# Patient Record
Sex: MALE | Race: WHITE | NOT HISPANIC OR LATINO | Employment: FULL TIME | URBAN - NONMETROPOLITAN AREA
[De-identification: names, ages, dates, MRNs, and addresses within clinical notes are randomized per-mention and may not be internally consistent; named-entity substitution may affect disease eponyms.]

---

## 2018-06-23 ENCOUNTER — HOSPITAL ENCOUNTER (EMERGENCY)
Facility: HOSPITAL | Age: 23
Discharge: HOME | End: 2018-06-23
Attending: EMERGENCY MEDICINE | Admitting: EMERGENCY MEDICINE

## 2018-06-23 VITALS
HEART RATE: 87 BPM | DIASTOLIC BLOOD PRESSURE: 80 MMHG | OXYGEN SATURATION: 100 % | BODY MASS INDEX: 45.1 KG/M2 | SYSTOLIC BLOOD PRESSURE: 148 MMHG | WEIGHT: 315 LBS | HEIGHT: 70 IN | RESPIRATION RATE: 18 BRPM | TEMPERATURE: 97.9 F

## 2018-06-23 DIAGNOSIS — L98.9 SKIN LESION OF NECK: ICD-10-CM

## 2018-06-23 DIAGNOSIS — J06.9 UPPER RESPIRATORY TRACT INFECTION, UNSPECIFIED TYPE: Primary | ICD-10-CM

## 2018-06-23 PROCEDURE — 99282 EMERGENCY DEPT VISIT SF MDM: CPT

## 2018-06-23 RX ORDER — CEPHALEXIN 500 MG/1
500 CAPSULE ORAL 4 TIMES DAILY
Qty: 40 CAPSULE | Refills: 0 | Status: SHIPPED | OUTPATIENT
Start: 2018-06-23 | End: 2018-11-06

## 2018-06-23 RX ORDER — ASPIRIN 81 MG/1
81 TABLET, CHEWABLE ORAL DAILY
COMMUNITY
End: 2018-11-06 | Stop reason: ALTCHOICE

## 2018-06-23 RX ORDER — CETIRIZINE HYDROCHLORIDE 10 MG/1
10 TABLET ORAL DAILY
Qty: 14 TABLET | Refills: 0 | Status: SHIPPED | OUTPATIENT
Start: 2018-06-23 | End: 2018-11-06

## 2018-06-23 RX ORDER — LISINOPRIL AND HYDROCHLOROTHIAZIDE 20; 12.5 MG/1; MG/1
1 TABLET ORAL DAILY
COMMUNITY
End: 2018-11-06 | Stop reason: ALTCHOICE

## 2018-06-23 RX ORDER — AMLODIPINE BESYLATE 5 MG/1
5 TABLET ORAL DAILY
COMMUNITY
End: 2018-11-06 | Stop reason: ALTCHOICE

## 2018-06-23 RX ORDER — PAROXETINE 10 MG/1
10 TABLET, FILM COATED ORAL EVERY MORNING
COMMUNITY
End: 2018-11-06 | Stop reason: ALTCHOICE

## 2018-10-26 ENCOUNTER — TRANSCRIBE ORDERS (OUTPATIENT)
Dept: ADMINISTRATIVE | Facility: HOSPITAL | Age: 23
End: 2018-10-26

## 2018-10-26 DIAGNOSIS — Q28.2 CEREBRAL-RETINAL ARTERIOVENOUS ANEURYSM: Primary | ICD-10-CM

## 2018-11-06 ENCOUNTER — CONSULT (OUTPATIENT)
Dept: CARDIOLOGY | Facility: CLINIC | Age: 23
End: 2018-11-06

## 2018-11-06 VITALS
WEIGHT: 315 LBS | HEIGHT: 70 IN | SYSTOLIC BLOOD PRESSURE: 130 MMHG | HEART RATE: 96 BPM | RESPIRATION RATE: 18 BRPM | BODY MASS INDEX: 45.1 KG/M2 | OXYGEN SATURATION: 99 % | DIASTOLIC BLOOD PRESSURE: 80 MMHG

## 2018-11-06 DIAGNOSIS — E66.01 MORBIDLY OBESE (HCC): ICD-10-CM

## 2018-11-06 DIAGNOSIS — Q28.2 AVM (ARTERIOVENOUS MALFORMATION) BRAIN: ICD-10-CM

## 2018-11-06 DIAGNOSIS — I10 ESSENTIAL HYPERTENSION: Primary | ICD-10-CM

## 2018-11-06 PROCEDURE — 93000 ELECTROCARDIOGRAM COMPLETE: CPT | Performed by: INTERNAL MEDICINE

## 2018-11-06 PROCEDURE — 99243 OFF/OP CNSLTJ NEW/EST LOW 30: CPT | Performed by: INTERNAL MEDICINE

## 2018-11-06 RX ORDER — CLONIDINE HYDROCHLORIDE 0.1 MG/1
0.1 TABLET ORAL EVERY 6 HOURS SCHEDULED
Qty: 30 TABLET | Refills: 11 | Status: SHIPPED | OUTPATIENT
Start: 2018-11-06 | End: 2018-11-06 | Stop reason: SDUPTHER

## 2018-11-06 RX ORDER — CLONIDINE HYDROCHLORIDE 0.1 MG/1
0.1 TABLET ORAL EVERY 6 HOURS SCHEDULED
Qty: 30 TABLET | Refills: 11 | Status: SHIPPED | OUTPATIENT
Start: 2018-11-06

## 2018-11-06 RX ORDER — GLIPIZIDE 5 MG/1
5 TABLET, FILM COATED, EXTENDED RELEASE ORAL DAILY
COMMUNITY
Start: 2018-10-18

## 2018-11-06 RX ORDER — AMLODIPINE BESYLATE 10 MG/1
10 TABLET ORAL DAILY
COMMUNITY
Start: 2018-10-18

## 2018-11-06 RX ORDER — PRAVASTATIN SODIUM 40 MG
40 TABLET ORAL DAILY
COMMUNITY
Start: 2018-10-18

## 2018-11-06 RX ORDER — FLUOXETINE HYDROCHLORIDE 20 MG/1
60 CAPSULE ORAL DAILY
COMMUNITY
Start: 2018-10-18

## 2018-11-06 RX ORDER — LISINOPRIL AND HYDROCHLOROTHIAZIDE 25; 20 MG/1; MG/1
2 TABLET ORAL DAILY
COMMUNITY
Start: 2018-10-18 | End: 2022-08-18

## 2018-11-06 NOTE — PROGRESS NOTES
Subjective:     Encounter Date:11/06/2018      Patient ID: Ken Cheung is a 23 y.o. male.    Chief Complaint: Hypertension  HPI  This is a 23-year-old male patient with a history of at least 5 years of hypertension that has been reasonably difficult to control.  The patient is currently on 3 high-dose antihypertensive medications and reports compliance.  His recent blood pressures have been significantly improved.  The patient is currently trying to lose weight and has lost from 348 pounds down to 331 pounds.  The patient also has a history of a documented cerebral arteriovenous malformation which has leaked on at least one occasion with small intracranial hemorrhage.  The patient has no chest discomfort at rest or with activity.  There is no shortness of breath.  There is no orthopnea PND or lower extremity edema.  He reports some dizziness if he changes posture rapidly.  This is particularly worse if he is bending over or squatting down and raises up suddenly.  He has no palpitations or syncope.  He is a nonsmoker.  The following portions of the patient's history were reviewed and updated as appropriate: allergies, current medications, past family history, past medical history, past social history, past surgical history and problem  Review of Systems   Constitution: Positive for weakness and malaise/fatigue. Negative for chills, diaphoresis, fever, weight gain and weight loss.   HENT: Negative for ear discharge, hearing loss, hoarse voice and nosebleeds.    Eyes: Negative for discharge, double vision, pain and photophobia.   Cardiovascular: Negative for chest pain, claudication, cyanosis, dyspnea on exertion, irregular heartbeat, leg swelling, near-syncope, orthopnea, palpitations, paroxysmal nocturnal dyspnea and syncope.   Respiratory: Negative for cough, hemoptysis, sputum production and wheezing.    Endocrine: Negative for cold intolerance, heat intolerance, polydipsia, polyphagia and polyuria.    Hematologic/Lymphatic: Negative for adenopathy and bleeding problem. Does not bruise/bleed easily.   Skin: Negative for color change, flushing, itching and rash.   Musculoskeletal: Negative for muscle cramps, muscle weakness, myalgias and stiffness.   Gastrointestinal: Negative for abdominal pain, diarrhea, hematemesis, hematochezia, nausea and vomiting.   Genitourinary: Negative for dysuria, frequency and nocturia.   Neurological: Positive for dizziness. Negative for focal weakness, loss of balance, numbness, paresthesias and seizures.   Psychiatric/Behavioral: Negative for altered mental status, hallucinations and suicidal ideas.   Allergic/Immunologic: Negative for HIV exposure, hives and persistent infections.           Current Outpatient Prescriptions:   •  amLODIPine (NORVASC) 10 MG tablet, Daily., Disp: , Rfl:   •  CloNIDine (CATAPRES) 0.1 MG tablet, Take 1 tablet by mouth Every 6 (Six) Hours. PRN for SBP>150 mm Hg and/or DBP> 100 mm Hg Take 2 tablets PO as needed for SBP> 200 mm Hg, Disp: 30 tablet, Rfl: 11  •  FLUoxetine (PROzac) 20 MG capsule, Daily., Disp: , Rfl:   •  glipiZIDE (GLUCOTROL XL) 5 MG ER tablet, Daily., Disp: , Rfl:   •  lisinopril-hydrochlorothiazide (PRINZIDE,ZESTORETIC) 20-25 MG per tablet, 2 tablets Daily., Disp: , Rfl:   •  pravastatin (PRAVACHOL) 40 MG tablet, , Disp: , Rfl:      Objective:     Physical Exam   Constitutional: He is oriented to person, place, and time. He appears well-developed and well-nourished. No distress.   HENT:   Head: Normocephalic and atraumatic.   Mouth/Throat: Oropharynx is clear and moist.   Eyes: Pupils are equal, round, and reactive to light. Conjunctivae and EOM are normal. No scleral icterus.   Neck: Normal range of motion. Neck supple. No JVD present. No tracheal deviation present. No thyromegaly present.   Cardiovascular: Normal rate, regular rhythm, S1 normal, S2 normal, normal heart sounds, intact distal pulses and normal pulses.  PMI is not  "displaced.  Exam reveals no gallop and no friction rub.    No murmur heard.  Pulmonary/Chest: Effort normal and breath sounds normal. No respiratory distress. He has no wheezes. He has no rales.   Abdominal: Soft. Bowel sounds are normal. He exhibits no distension and no mass. There is no tenderness. There is no rebound and no guarding.   Musculoskeletal: Normal range of motion. He exhibits no edema or deformity.   Neurological: He is alert and oriented to person, place, and time. He displays normal reflexes. No cranial nerve deficit. Coordination normal.   Skin: Skin is warm and dry. No rash noted. He is not diaphoretic. No erythema.   Psychiatric: He has a normal mood and affect. His behavior is normal. Thought content normal.     Blood pressure 130/80, pulse 96, resp. rate 18, height 177.8 cm (70\"), weight (!) 150 kg (331 lb), SpO2 99 %.   Lab Review:       Assessment:         1. Morbidly obese (CMS/HCC)  Body mass index is greater than 47 but down from 50.  This is due to excess calorie intake.  His obesity pattern is central.  There is evidence of multiple comorbidities.  He appears to have early obesity-related type 2 diabetes mellitus.    2. Essential hypertension  Acceptable blood pressure control.  - Adult Transthoracic Echo Complete W/ Cont if Necessary Per Protocol  - US Renal Transplant    3. AVM (arteriovenous malformation) brain  The patient is currently awaiting evaluation from a local neurologist.      ECG 12 Lead  Date/Time: 11/6/2018 9:44 AM  Performed by: MICHELLE BARR  Authorized by: MICHELLE BARR   Rhythm: sinus rhythm  Rate: normal  QRS axis: normal  Clinical impression: normal ECG             Plan:       The importance of diet exercise and weight management have been reinforced with the patient.  He has been congratulated on his weights loss and encouraged to continue these efforts.  He has been counseled regarding the importance of dietary sodium restriction.  He is been given a list " of specific foods and beverages that are high in sodium content with instructions to avoid these.  I have recommended a 1500 mg per day sodium restriction.  I have also recommended a 1.5 L per day fluid restriction.  I have recommended adding clonidine 0.1 mg every 6 hours as needed for systolic blood pressure greater than 150 and/or diastolic blood pressure greater than 100 mmHg.  The patient has been counseled that if he has any systolic blood pressures greater than 200 mmHg that he can take 20.1 mg clonidine tablets at the same time.  I have recommended an echocardiogram as well as a renal artery ultrasound with duplex Doppler to screen for renal artery stenosis.  The patient is encouraged to keep his follow-up appointment with the neurologist as the combination of poorly controlled hypertension and a cerebral AVM is potentially deadly.  45 minutes have been spent with the patient the entire time dedicated to face-to-face discussion with emphasis on patient education and counseling.

## 2018-11-08 ENCOUNTER — HOSPITAL ENCOUNTER (EMERGENCY)
Facility: HOSPITAL | Age: 23
Discharge: HOME | End: 2018-11-08
Attending: EMERGENCY MEDICINE | Admitting: EMERGENCY MEDICINE

## 2018-11-08 VITALS
HEART RATE: 87 BPM | TEMPERATURE: 97.4 F | BODY MASS INDEX: 44.1 KG/M2 | HEIGHT: 71 IN | DIASTOLIC BLOOD PRESSURE: 80 MMHG | SYSTOLIC BLOOD PRESSURE: 148 MMHG | OXYGEN SATURATION: 99 % | RESPIRATION RATE: 17 BRPM | WEIGHT: 315 LBS

## 2018-11-08 DIAGNOSIS — K60.2 ANAL FISSURE: Primary | ICD-10-CM

## 2018-11-08 PROCEDURE — 99282 EMERGENCY DEPT VISIT SF MDM: CPT

## 2018-11-08 RX ORDER — POLYETHYLENE GLYCOL 3350 17 G/17G
17 POWDER, FOR SOLUTION ORAL DAILY PRN
Qty: 119 G | Refills: 0 | Status: SHIPPED | OUTPATIENT
Start: 2018-11-08 | End: 2019-12-12

## 2018-11-09 ENCOUNTER — HOSPITAL ENCOUNTER (OUTPATIENT)
Dept: ULTRASOUND IMAGING | Facility: HOSPITAL | Age: 23
Discharge: HOME | End: 2018-11-09
Attending: INTERNAL MEDICINE

## 2018-11-09 ENCOUNTER — HOSPITAL ENCOUNTER (OUTPATIENT)
Dept: MRI IMAGING | Facility: HOSPITAL | Age: 23
Discharge: HOME | End: 2018-11-09
Admitting: INTERNAL MEDICINE

## 2018-11-09 DIAGNOSIS — Q28.2 CEREBRAL-RETINAL ARTERIOVENOUS ANEURYSM: ICD-10-CM

## 2018-11-09 DIAGNOSIS — I10 ESSENTIAL HYPERTENSION: ICD-10-CM

## 2018-11-09 PROCEDURE — 93975 VASCULAR STUDY: CPT

## 2018-11-09 PROCEDURE — 70544 MR ANGIOGRAPHY HEAD W/O DYE: CPT

## 2018-11-09 NOTE — DISCHARGE INSTRUCTIONS
Please use the prescribed MiraLAX at least once daily until you have 2 large bowel movements and your stools become soft.

## 2018-11-09 NOTE — ED PROVIDER NOTES
Subjective   History of Present Illness   23-year-old male with history of diabetes and hypertension who has had intermittent constipation for the last several weeks and blood on toilet paper and in his level.  Will days.  States that he has been more constipated last several days and is straining to have painful bowel movements.  Denies any abdominal pain, weakness, easy fatigue, lightheadedness when he stands up, or other specific symptoms.  Does not take any blood thinners.    Review of Systems   Gastrointestinal: Positive for blood in stool and rectal pain.   All other systems reviewed and are negative.      Past Medical History:   Diagnosis Date   • Anxiety    • AVM (arteriovenous malformation)    • Depression    • Diabetes mellitus (CMS/HCC)    • Hypertension    • Kidney infection        No Known Allergies    Past Surgical History:   Procedure Laterality Date   • EAR TUBES     • EYE MUSCLE SURGERY     • TONSILLECTOMY AND ADENOIDECTOMY         Family History   Problem Relation Age of Onset   • Stroke Father    • Hypertension Father    • Hyperlipidemia Father    • Cancer Father    • Hypertension Mother    • Hyperlipidemia Mother    • Cancer Maternal Uncle    • Heart attack Paternal Uncle    • Hyperlipidemia Paternal Uncle    • Hypertension Paternal Uncle    • Cancer Paternal Uncle        Social History     Social History   • Marital status: Single     Social History Main Topics   • Smoking status: Never Smoker   • Smokeless tobacco: Never Used   • Alcohol use Yes      Comment: OCCASSION   • Drug use: No   • Sexual activity: Defer     Other Topics Concern   • Not on file           Objective   Physical Exam   Constitutional: He is oriented to person, place, and time. He appears well-developed and well-nourished. No distress.   Obese   HENT:   Head: Normocephalic.   Mouth/Throat: Oropharynx is clear and moist.   Eyes: No scleral icterus.   Neck: No tracheal deviation present.   Cardiovascular: Normal rate, regular  rhythm, normal heart sounds and intact distal pulses.  Exam reveals no gallop and no friction rub.    No murmur heard.  Pulmonary/Chest: Effort normal and breath sounds normal. No stridor. No respiratory distress. He has no wheezes. He has no rales.   Abdominal: Soft. He exhibits no distension and no mass. There is no tenderness. There is no rebound and no guarding.   Genitourinary:   Genitourinary Comments: Small anal fissure at 6 o'clock. TTP   Musculoskeletal: Normal range of motion. He exhibits no deformity.   Neurological: He is alert and oriented to person, place, and time.   Skin: Skin is warm and dry. No rash noted. He is not diaphoretic. No erythema. No pallor.   Psychiatric: He has a normal mood and affect. His behavior is normal.   Nursing note and vitals reviewed.      Procedures           ED Course                  MDM  23-year-old male here with anal fissure in the setting of constipation.  Afebrile, vital signs stable.  We'll discharge home with regulation sitz baths and MiraLAX.  Discussed return to care precautions.    Final diagnoses:   Anal fissure            Geraldo Marrero MD  11/08/18 9613

## 2018-11-13 ENCOUNTER — TELEPHONE (OUTPATIENT)
Dept: CARDIOLOGY | Facility: CLINIC | Age: 23
End: 2018-11-13

## 2018-11-13 NOTE — TELEPHONE ENCOUNTER
----- Message from Tino Norman MD sent at 11/12/2018  7:34 AM EST -----  Let him know that this was fine

## 2018-11-14 LAB
BH CV ECHO MEAS - % IVS THICK: 44.4 %
BH CV ECHO MEAS - % LVPW THICK: 60 %
BH CV ECHO MEAS - AO MAX PG (FULL): 1.7 MMHG
BH CV ECHO MEAS - AO MAX PG: 6 MMHG
BH CV ECHO MEAS - AO MEAN PG (FULL): 2 MMHG
BH CV ECHO MEAS - AO MEAN PG: 4 MMHG
BH CV ECHO MEAS - AO ROOT AREA: 6.2 CM^2
BH CV ECHO MEAS - AO ROOT DIAM: 2.8 CM
BH CV ECHO MEAS - AO V2 MAX: 124.5 CM/SEC
BH CV ECHO MEAS - AO V2 MEAN: 92.1 CM/SEC
BH CV ECHO MEAS - AO V2 VTI: 20.1 CM
BH CV ECHO MEAS - AVA(I,A): 3.5 CM^2
BH CV ECHO MEAS - AVA(I,D): 3.5 CM^2
BH CV ECHO MEAS - AVA(V,A): 3.4 CM^2
BH CV ECHO MEAS - AVA(V,D): 3.4 CM^2
BH CV ECHO MEAS - EDV(CUBED): 97.3 ML
BH CV ECHO MEAS - EDV(TEICH): 97.3 ML
BH CV ECHO MEAS - EF(CUBED): 72.3 %
BH CV ECHO MEAS - EF(MOD-BP): 64 %
BH CV ECHO MEAS - EF(TEICH): 64 %
BH CV ECHO MEAS - ESV(CUBED): 27 ML
BH CV ECHO MEAS - ESV(TEICH): 35 ML
BH CV ECHO MEAS - FS: 34.8 %
BH CV ECHO MEAS - IVS/LVPW: 1.2
BH CV ECHO MEAS - IVSD: 0.9 CM
BH CV ECHO MEAS - IVSS: 1.3 CM
BH CV ECHO MEAS - LA DIMENSION: 3.4 CM
BH CV ECHO MEAS - LA/AO: 1.2
BH CV ECHO MEAS - LAT PEAK E' VEL: 15.4 CM/SEC
BH CV ECHO MEAS - LATERAL E/E' RATIO: 4.8
BH CV ECHO MEAS - LV IVRT: 0.06 SEC
BH CV ECHO MEAS - LV MASS(C)D: 122.7 GRAMS
BH CV ECHO MEAS - LV MASS(C)S: 116.6 GRAMS
BH CV ECHO MEAS - LV MAX PG: 4.3 MMHG
BH CV ECHO MEAS - LV MEAN PG: 2 MMHG
BH CV ECHO MEAS - LV V1 MAX: 103 CM/SEC
BH CV ECHO MEAS - LV V1 MEAN: 69.5 CM/SEC
BH CV ECHO MEAS - LV V1 VTI: 17.1 CM
BH CV ECHO MEAS - LVIDD: 4.6 CM
BH CV ECHO MEAS - LVIDS: 3 CM
BH CV ECHO MEAS - LVOT AREA (M): 4.2 CM^2
BH CV ECHO MEAS - LVOT AREA: 4.2 CM^2
BH CV ECHO MEAS - LVOT DIAM: 2.3 CM
BH CV ECHO MEAS - LVPWD: 0.75 CM
BH CV ECHO MEAS - LVPWS: 1.2 CM
BH CV ECHO MEAS - MED PEAK E' VEL: 12.7 CM/SEC
BH CV ECHO MEAS - MEDIAL E/E' RATIO: 5.8
BH CV ECHO MEAS - MV A MAX VEL: 55 CM/SEC
BH CV ECHO MEAS - MV DEC SLOPE: 351 CM/SEC^2
BH CV ECHO MEAS - MV DEC TIME: 0.19 SEC
BH CV ECHO MEAS - MV E MAX VEL: 73.6 CM/SEC
BH CV ECHO MEAS - MV E/A: 1.3
BH CV ECHO MEAS - MV MAX PG: 2 MMHG
BH CV ECHO MEAS - MV MEAN PG: 1 MMHG
BH CV ECHO MEAS - MV P1/2T MAX VEL: 75.6 CM/SEC
BH CV ECHO MEAS - MV P1/2T: 63.1 MSEC
BH CV ECHO MEAS - MV V2 MAX: 70.3 CM/SEC
BH CV ECHO MEAS - MV V2 MEAN: 41.3 CM/SEC
BH CV ECHO MEAS - MV V2 VTI: 18.2 CM
BH CV ECHO MEAS - MVA P1/2T LCG: 2.9 CM^2
BH CV ECHO MEAS - MVA(P1/2T): 3.5 CM^2
BH CV ECHO MEAS - MVA(VTI): 3.9 CM^2
BH CV ECHO MEAS - PA MAX PG: 5.2 MMHG
BH CV ECHO MEAS - PA V2 MAX: 114 CM/SEC
BH CV ECHO MEAS - RAP SYSTOLE: 10 MMHG
BH CV ECHO MEAS - RVSP: 23 MMHG
BH CV ECHO MEAS - SV(AO): 123.8 ML
BH CV ECHO MEAS - SV(CUBED): 70.3 ML
BH CV ECHO MEAS - SV(LVOT): 71 ML
BH CV ECHO MEAS - SV(TEICH): 62.3 ML
BH CV ECHO MEAS - TR MAX PG: 13 MMHG
BH CV ECHO MEAS - TR MAX VEL: 178 CM/SEC
BH CV ECHO MEAS - TV MAX PG: 0.93 MMHG
BH CV ECHO MEAS - TV V2 MAX: 48.1 CM/SEC
BH CV ECHO MEASUREMENTS AVERAGE E/E' RATIO: 5.24

## 2018-11-26 ENCOUNTER — OFFICE VISIT (OUTPATIENT)
Dept: SURGERY | Facility: CLINIC | Age: 23
End: 2018-11-26

## 2018-11-26 VITALS
HEIGHT: 70 IN | HEART RATE: 88 BPM | OXYGEN SATURATION: 98 % | SYSTOLIC BLOOD PRESSURE: 118 MMHG | WEIGHT: 315 LBS | TEMPERATURE: 98.6 F | DIASTOLIC BLOOD PRESSURE: 72 MMHG | BODY MASS INDEX: 45.1 KG/M2

## 2018-11-26 DIAGNOSIS — K62.5 RECTAL BLEED: Primary | ICD-10-CM

## 2018-11-26 PROCEDURE — 99244 OFF/OP CNSLTJ NEW/EST MOD 40: CPT | Performed by: SURGERY

## 2018-11-26 RX ORDER — POLYETHYLENE GLYCOL 1450
1 POWDER (GRAM) MISCELLANEOUS
Qty: 238 G | Refills: 0 | Status: SHIPPED | OUTPATIENT
Start: 2018-11-26 | End: 2018-11-26

## 2018-11-26 RX ORDER — BISACODYL 5 MG/1
10 TABLET, DELAYED RELEASE ORAL 2 TIMES DAILY
Qty: 4 TABLET | Refills: 0 | Status: SHIPPED | OUTPATIENT
Start: 2018-11-26 | End: 2018-11-28

## 2018-11-26 NOTE — PROGRESS NOTES
Patient: Ken Cheung    YOB: 1995    Date: 11/26/2018    Primary Care Provider: Ericka Contreras APRN    Chief Complaint   Patient presents with   • Rectal Bleeding     blood in stool       Subjective .     History of present illness:  Patient is here for evaluation of anal fissure per primary care provider. Patient states he had started a new diet in May recently lost 25 lbs. Patient states that after starting diet constipation started. Patient states he has been taking laxatives for constipation however he notice blood in stool 2 weeks ago and it has continued since.  Patient states recal pain accompanied with blood during bowel movements.     He has been on a diet recently and has lost 25-35 pounds.  He does complain of perirectal discomfort, sharp in nature, worse with constipation, relieved by not having a bowel movement, associated with bright red blood per rectum.        The following portions of the patient's history were reviewed and updated as appropriate: allergies, current medications, past family history, past medical history, past social history, past surgical history and problem list.       Review of Systems   Constitutional: Negative for chills, fever and unexpected weight change.   HENT: Negative for trouble swallowing and voice change.    Eyes: Negative for visual disturbance.   Respiratory: Negative for apnea, cough, chest tightness, shortness of breath and wheezing.    Cardiovascular: Negative for chest pain, palpitations and leg swelling.   Gastrointestinal: Positive for blood in stool, constipation, diarrhea and rectal pain. Negative for abdominal distention, abdominal pain, anal bleeding, nausea and vomiting.   Endocrine: Negative for cold intolerance and heat intolerance.   Genitourinary: Negative for difficulty urinating, dysuria, flank pain, scrotal swelling and testicular pain.   Musculoskeletal: Negative for back pain, gait problem and joint swelling.   Skin:  "Negative for color change, rash and wound.   Neurological: Negative for dizziness, syncope, speech difficulty, weakness, numbness and headaches.   Hematological: Negative for adenopathy. Does not bruise/bleed easily.   Psychiatric/Behavioral: Negative for confusion. The patient is not nervous/anxious.        Allergies:  No Known Allergies    Medications:    Current Outpatient Medications:   •  amLODIPine (NORVASC) 10 MG tablet, Daily., Disp: , Rfl:   •  bisacodyl (DULCOLAX) 5 MG EC tablet, Take 2 tablets by mouth 2 (Two) Times a Day for 1 day., Disp: 4 tablet, Rfl: 0  •  CloNIDine (CATAPRES) 0.1 MG tablet, Take 1 tablet by mouth Every 6 (Six) Hours. PRN for SBP>150 mm Hg and/or DBP> 100 mm Hg Take 2 tablets PO as needed for SBP> 200 mm Hg, Disp: 30 tablet, Rfl: 11  •  FLUoxetine (PROzac) 20 MG capsule, Daily., Disp: , Rfl:   •  glipiZIDE (GLUCOTROL XL) 5 MG ER tablet, Daily., Disp: , Rfl:   •  lisinopril-hydrochlorothiazide (PRINZIDE,ZESTORETIC) 20-25 MG per tablet, 2 tablets Daily., Disp: , Rfl:   •  polyethylene glycol (MIRALAX) packet, Take 17 g by mouth Daily As Needed (constipation)., Disp: 119 g, Rfl: 0  •  pravastatin (PRAVACHOL) 40 MG tablet, , Disp: , Rfl:     History\"  Past Medical History:   Diagnosis Date   • Anxiety    • AVM (arteriovenous malformation)    • Depression    • Diabetes mellitus (CMS/HCC)    • Hypertension    • Kidney infection        Past Surgical History:   Procedure Laterality Date   • EAR TUBES     • EYE MUSCLE SURGERY     • TONSILLECTOMY AND ADENOIDECTOMY         Family History   Problem Relation Age of Onset   • Stroke Father    • Hypertension Father    • Hyperlipidemia Father    • Cancer Father    • Hypertension Mother    • Hyperlipidemia Mother    • Cancer Maternal Uncle    • Heart attack Paternal Uncle    • Hyperlipidemia Paternal Uncle    • Hypertension Paternal Uncle    • Cancer Paternal Uncle        Social History     Tobacco Use   • Smoking status: Never Smoker   • Smokeless " "tobacco: Never Used   Substance Use Topics   • Alcohol use: Yes     Comment: OCCASSION   • Drug use: No        Objective     Vital Signs:   Vitals:    11/26/18 0938   BP: 118/72   Pulse: 88   Temp: 98.6 °F (37 °C)   SpO2: 98%   Weight: (!) 151 kg (332 lb)   Height: 177.8 cm (70\")       Physical Exam:   General Appearance:    Alert, cooperative, in no acute distress   Head:    Normocephalic, without obvious abnormality, atraumatic   Eyes:            Lids and lashes normal, conjunctivae and sclerae normal, no   icterus, no pallor, corneas clear, PERRLA   Ears:    Ears appear intact with no abnormalities noted   Throat:   No oral lesions, no thrush, oral mucosa moist   Neck:   No adenopathy, supple, trachea midline, no thyromegaly, no   carotid bruit, no JVD   Lungs:     Clear to auscultation,respirations regular, even and                  unlabored    Heart:    Regular rhythm and normal rate, normal S1 and S2, no            murmur, no gallop, no rub, no click   Chest Wall:    No abnormalities observed   Abdomen:     Normal bowel sounds, no masses, no organomegaly, soft        non-tender, non-distended, no guarding, no rebound                tenderness, no evidence of obvious hernia    Extremities:   Moves all extremities well, no edema, no cyanosis, no             redness   Pulses:   Pulses palpable and equal bilaterally   Skin:   No bleeding, bruising or rash   Lymph nodes:   No palpable adenopathy   Neurologic:   Cranial nerves 2 - 12 grossly intact, sensation intact, DTR       present and equal bilaterally     Results Review:   I reviewed the patient's new clinical results.  I reviewed the patient's new imaging results and agree with the interpretation.  I reviewed the patient's other test results and agree with the interpretation    Assessment/Plan     1. Rectal bleed        In short, I have given the patient and the mother instructions with regards to her fiber supplementation.  Also think he needs to have a " colonoscopy.  Risk and benefits of operative versus nonoperative intervention of been discussed with the patient, he understands and agrees, and wishes to proceed.    I discussed the patients findings and my recommendations with patient and family.    Review of Systems was reviewed and confirmed as accurate today.    Electronically signed by Denny Bellamy MD  11/27/18      .

## 2018-11-27 PROBLEM — K62.5 RECTAL BLEED: Status: ACTIVE | Noted: 2018-11-27

## 2018-11-30 ENCOUNTER — TELEPHONE (OUTPATIENT)
Dept: SURGERY | Facility: CLINIC | Age: 23
End: 2018-11-30

## 2018-12-04 ENCOUNTER — ANESTHESIA (OUTPATIENT)
Dept: GASTROENTEROLOGY | Facility: HOSPITAL | Age: 23
End: 2018-12-04

## 2018-12-04 ENCOUNTER — HOSPITAL ENCOUNTER (OUTPATIENT)
Facility: HOSPITAL | Age: 23
Setting detail: HOSPITAL OUTPATIENT SURGERY
Discharge: HOME | End: 2018-12-04
Attending: SURGERY | Admitting: SURGERY

## 2018-12-04 ENCOUNTER — ANESTHESIA EVENT (OUTPATIENT)
Dept: GASTROENTEROLOGY | Facility: HOSPITAL | Age: 23
End: 2018-12-04

## 2018-12-04 VITALS
DIASTOLIC BLOOD PRESSURE: 78 MMHG | HEART RATE: 77 BPM | TEMPERATURE: 97.6 F | WEIGHT: 315 LBS | RESPIRATION RATE: 18 BRPM | OXYGEN SATURATION: 98 % | BODY MASS INDEX: 44.1 KG/M2 | HEIGHT: 71 IN | SYSTOLIC BLOOD PRESSURE: 121 MMHG

## 2018-12-04 DIAGNOSIS — K62.5 RECTAL BLEED: ICD-10-CM

## 2018-12-04 LAB — GLUCOSE BLDC GLUCOMTR-MCNC: 121 MG/DL (ref 70–130)

## 2018-12-04 PROCEDURE — 25010000002 PROPOFOL 200 MG/20ML EMULSION: Performed by: NURSE ANESTHETIST, CERTIFIED REGISTERED

## 2018-12-04 PROCEDURE — 25010000002 ONDANSETRON PER 1 MG: Performed by: NURSE ANESTHETIST, CERTIFIED REGISTERED

## 2018-12-04 PROCEDURE — 82962 GLUCOSE BLOOD TEST: CPT

## 2018-12-04 RX ORDER — SODIUM CHLORIDE 0.9 % (FLUSH) 0.9 %
3 SYRINGE (ML) INJECTION AS NEEDED
Status: DISCONTINUED | OUTPATIENT
Start: 2018-12-04 | End: 2018-12-04 | Stop reason: HOSPADM

## 2018-12-04 RX ORDER — SODIUM CHLORIDE, SODIUM LACTATE, POTASSIUM CHLORIDE, CALCIUM CHLORIDE 600; 310; 30; 20 MG/100ML; MG/100ML; MG/100ML; MG/100ML
1000 INJECTION, SOLUTION INTRAVENOUS CONTINUOUS
Status: DISCONTINUED | OUTPATIENT
Start: 2018-12-04 | End: 2018-12-04 | Stop reason: HOSPADM

## 2018-12-04 RX ORDER — ONDANSETRON 2 MG/ML
INJECTION INTRAMUSCULAR; INTRAVENOUS AS NEEDED
Status: DISCONTINUED | OUTPATIENT
Start: 2018-12-04 | End: 2018-12-04 | Stop reason: SURG

## 2018-12-04 RX ORDER — PROPOFOL 10 MG/ML
INJECTION, EMULSION INTRAVENOUS AS NEEDED
Status: DISCONTINUED | OUTPATIENT
Start: 2018-12-04 | End: 2018-12-04 | Stop reason: SURG

## 2018-12-04 RX ADMIN — PROPOFOL 200 MG: 10 INJECTION, EMULSION INTRAVENOUS at 08:48

## 2018-12-04 RX ADMIN — PROPOFOL 100 MG: 10 INJECTION, EMULSION INTRAVENOUS at 08:51

## 2018-12-04 RX ADMIN — LIDOCAINE HYDROCHLORIDE 60 MG: 20 INJECTION, SOLUTION INTRAVENOUS at 08:48

## 2018-12-04 RX ADMIN — SODIUM CHLORIDE, POTASSIUM CHLORIDE, SODIUM LACTATE AND CALCIUM CHLORIDE 1000 ML: 600; 310; 30; 20 INJECTION, SOLUTION INTRAVENOUS at 08:14

## 2018-12-04 RX ADMIN — ONDANSETRON 4 MG: 2 INJECTION INTRAMUSCULAR; INTRAVENOUS at 09:02

## 2018-12-04 RX ADMIN — PROPOFOL 50 MG: 10 INJECTION, EMULSION INTRAVENOUS at 08:57

## 2018-12-04 NOTE — ANESTHESIA POSTPROCEDURE EVALUATION
Patient: Ken Cheung    Procedure Summary     Date:  12/04/18 Room / Location:  [facility] ENDOSCOPY 3 / [facility] ENDOSCOPY    Anesthesia Start:  0843 Anesthesia Stop:  0905    Procedure:  COLONOSCOPY WITH COLD BIOPSY (N/A ) Diagnosis:       Rectal bleed      (Rectal bleed [K62.5])    Surgeon:  Denny Bellamy MD Provider:  Torri Youssef CRNA    Anesthesia Type:  MAC ASA Status:  3          Anesthesia Type: MAC  Last vitals  BP   121/78 (12/04/18 0937)   Temp   97.6 °F (36.4 °C) (12/04/18 0907)   Pulse   77 (12/04/18 0937)   Resp   18 (12/04/18 0937)     SpO2   98 % (12/04/18 0937)     Post Anesthesia Care and Evaluation    Patient location during evaluation: PHASE II  Patient participation: complete - patient participated  Level of consciousness: awake and alert  Pain score: 0  Pain management: satisfactory to patient  Airway patency: patent  Anesthetic complications: No anesthetic complications  PONV Status: none  Cardiovascular status: acceptable and stable  Respiratory status: acceptable  Hydration status: acceptable

## 2018-12-04 NOTE — DISCHARGE INSTRUCTIONS
Please follow all post op instructions and follow up appointment time from your physician's office included in your discharge packet.    To assist you in voiding:  Drink plenty of fluids  Listen to running water while attempting to void.    If you are unable to urinate and you have an uncomfortable urge to void or it has been   6 hours since you were discharged, return to the Emergency Room    No pushing, pulling, tugging,  heavy lifting, or strenuous activity.  No major decision making, driving, or drinking alcoholic beverages for 24 hours. ( due to the medications you have  received)  Always use good hand hygiene/washing techniques.  NO driving while taking pain medications.

## 2018-12-04 NOTE — ANESTHESIA PREPROCEDURE EVALUATION
" Anesthesia Evaluation     Patient summary reviewed and Nursing notes reviewed   NPO Solid Status: > 8 hours  NPO Liquid Status: > 8 hours           Airway   Mallampati: II  TM distance: >3 FB  Neck ROM: full  Possible difficult intubation and Large neck circumference  Dental - normal exam     Pulmonary - normal exam   (+) sleep apnea (claims to \"no longer have sleep apnea\".  He does have multiple risk factors),   Cardiovascular - normal exam    (+) hypertension, hyperlipidemia,       Neuro/Psych  (+) headaches, psychiatric history Anxiety and Depression,     GI/Hepatic/Renal/Endo    (+) morbid obesity,  diabetes mellitus,     Musculoskeletal (-) negative ROS    Abdominal  - normal exam    Bowel sounds: normal.   Substance History - negative use     OB/GYN negative ob/gyn ROS         Other                      Anesthesia Plan    ASA 3     MAC     intravenous induction   Anesthetic plan, all risks, benefits, and alternatives have been provided, discussed and informed consent has been obtained with: patient.    Plan discussed with attending.      "

## 2018-12-06 ENCOUNTER — OFFICE VISIT (OUTPATIENT)
Dept: CARDIOLOGY | Facility: CLINIC | Age: 23
End: 2018-12-06

## 2018-12-06 VITALS
DIASTOLIC BLOOD PRESSURE: 66 MMHG | BODY MASS INDEX: 44.1 KG/M2 | RESPIRATION RATE: 18 BRPM | HEART RATE: 81 BPM | HEIGHT: 71 IN | OXYGEN SATURATION: 99 % | WEIGHT: 315 LBS | SYSTOLIC BLOOD PRESSURE: 106 MMHG

## 2018-12-06 DIAGNOSIS — I10 ESSENTIAL HYPERTENSION: ICD-10-CM

## 2018-12-06 DIAGNOSIS — E66.01 MORBIDLY OBESE (HCC): Primary | ICD-10-CM

## 2018-12-06 PROCEDURE — 99213 OFFICE O/P EST LOW 20 MIN: CPT | Performed by: INTERNAL MEDICINE

## 2018-12-06 NOTE — PROGRESS NOTES
Subjective:     Encounter Date:12/06/2018      Patient ID: Ken Cheung is a 23 y.o. male.    Chief Complaint: Hypertension  HPI  This is a 23-year-old male patient who was recently evaluated for severe hypertension.  He was started on clonidine therapy with essential normalization of his blood pressure.  His other medications have continued unchanged.  Patient indicates that he has been trying to watch his sodium intake and has eliminated consumption of carbonated soda.  He has not yet been able to losing weight.  The patient underwent an echocardiogram which was normal. The echocardiogram showed no evidence of ventricular hypertrophy or cardiac chamber enlargement.  Left ventricular systolic and diastolic function was normal.  There was no evidence of valvular pathology of significance, pericardial disease, pulmonary hypertension or intracardiac shunting.  The left ventricular ejection fraction was normal and there were no regional wall motion abnormalities.  The patient also underwent a renal artery ultrasound with duplex Doppler which showed no evidence of renal artery stenosis.  The patient has no chest discomfort at rest or with activity.  There is no exertional chest arm neck jaw shoulder or back discomfort.  There is no orthopnea PND or lower extremity edema.  There is no dizziness palpitations or syncope.  He is a nonsmoker.    The following portions of the patient's history were reviewed and updated as appropriate: allergies, current medications, past family history, past medical history, past social history, past surgical history and problem  Review of Systems   Constitution: Negative for chills, diaphoresis, fever, weakness, malaise/fatigue, weight gain and weight loss.   HENT: Negative for ear discharge, hearing loss, hoarse voice and nosebleeds.    Eyes: Negative for discharge, double vision, pain and photophobia.   Cardiovascular: Negative for chest pain, claudication, cyanosis, dyspnea  on exertion, irregular heartbeat, leg swelling, near-syncope, orthopnea, palpitations, paroxysmal nocturnal dyspnea and syncope.   Respiratory: Negative for cough, hemoptysis, shortness of breath, sputum production and wheezing.    Endocrine: Negative for cold intolerance, heat intolerance, polydipsia, polyphagia and polyuria.   Hematologic/Lymphatic: Negative for adenopathy and bleeding problem. Does not bruise/bleed easily.   Skin: Negative for color change, flushing, itching and rash.   Musculoskeletal: Negative for muscle cramps, muscle weakness, myalgias and stiffness.   Gastrointestinal: Negative for abdominal pain, diarrhea, hematemesis, hematochezia, nausea and vomiting.   Genitourinary: Negative for dysuria, frequency and nocturia.   Neurological: Negative for focal weakness, loss of balance, numbness, paresthesias and seizures.   Psychiatric/Behavioral: Negative for altered mental status, hallucinations and suicidal ideas.   Allergic/Immunologic: Negative for HIV exposure, hives and persistent infections.           Current Outpatient Medications:   •  amLODIPine (NORVASC) 10 MG tablet, Take 10 mg by mouth Daily., Disp: , Rfl:   •  CloNIDine (CATAPRES) 0.1 MG tablet, Take 1 tablet by mouth Every 6 (Six) Hours. PRN for SBP>150 mm Hg and/or DBP> 100 mm Hg Take 2 tablets PO as needed for SBP> 200 mm Hg (Patient taking differently: Take 0.1 mg by mouth Take As Directed. PRN for SBP>150 mm Hg and/or DBP> 100 mm Hg Take 2 tablets PO as needed for SBP> 200 mm Hg), Disp: 30 tablet, Rfl: 11  •  FLUoxetine (PROzac) 20 MG capsule, Take 20 mg by mouth Daily., Disp: , Rfl:   •  glipiZIDE (GLUCOTROL XL) 5 MG ER tablet, Take 5 mg by mouth Daily., Disp: , Rfl:   •  lisinopril-hydrochlorothiazide (PRINZIDE,ZESTORETIC) 20-25 MG per tablet, Take 2 tablets by mouth Daily., Disp: , Rfl:   •  methylcellulose oral powder, Take 2 g by mouth 3 (Three) Times a Day., Disp: , Rfl:   •  polyethylene glycol (MIRALAX) packet, Take 17 g  "by mouth Daily As Needed (constipation). (Patient taking differently: Take 17 g by mouth Take As Directed. PER COLON PREP INSTRUCTIONS), Disp: 119 g, Rfl: 0  •  pravastatin (PRAVACHOL) 40 MG tablet, Take 40 mg by mouth Daily., Disp: , Rfl:      Objective:     Physical Exam   Constitutional: He is oriented to person, place, and time. He appears well-developed and well-nourished. No distress.   HENT:   Head: Normocephalic and atraumatic.   Mouth/Throat: Oropharynx is clear and moist.   Eyes: Conjunctivae and EOM are normal. Pupils are equal, round, and reactive to light. No scleral icterus.   Neck: Normal range of motion. Neck supple. No JVD present. No tracheal deviation present. No thyromegaly present.   Cardiovascular: Normal rate, regular rhythm, S1 normal, S2 normal, normal heart sounds, intact distal pulses and normal pulses. PMI is not displaced. Exam reveals no gallop and no friction rub.   No murmur heard.  Pulmonary/Chest: Effort normal and breath sounds normal. No respiratory distress. He has no wheezes. He has no rales.   Abdominal: Soft. Bowel sounds are normal. He exhibits no distension and no mass. There is no tenderness. There is no rebound and no guarding.   Musculoskeletal: Normal range of motion. He exhibits no edema or deformity.   Neurological: He is alert and oriented to person, place, and time. He displays normal reflexes. No cranial nerve deficit. Coordination normal.   Skin: Skin is warm and dry. No rash noted. He is not diaphoretic. No erythema.   Psychiatric: He has a normal mood and affect. His behavior is normal. Thought content normal.     Blood pressure 106/66, pulse 81, resp. rate 18, height 179.1 cm (70.51\"), weight (!) 150 kg (331 lb), SpO2 99 %.   Lab Review:       Assessment:         1. Morbidly obese (CMS/HCC)  His body mass index is greater than 46.  This is due to excess calorie intake.  The obesity pattern is central.  There is evidence of multiple comorbidities.    2. Essential " hypertension  His blood pressure control is now excellent.    Procedures     Plan:       No additional cardiovascular testing is indicated.  No changes in his medication therapy is warranted.  We have reinforced the importance of dietary sodium restriction.  The importance of diet exercise and weight management have been stressed to the patient.

## 2018-12-10 LAB
LAB AP CASE REPORT: NORMAL
PATH REPORT.FINAL DX SPEC: NORMAL

## 2018-12-20 ENCOUNTER — CONSULT (OUTPATIENT)
Dept: ONCOLOGY | Facility: CLINIC | Age: 23
End: 2018-12-20

## 2018-12-20 VITALS
RESPIRATION RATE: 21 BRPM | WEIGHT: 315 LBS | DIASTOLIC BLOOD PRESSURE: 83 MMHG | SYSTOLIC BLOOD PRESSURE: 175 MMHG | BODY MASS INDEX: 44.1 KG/M2 | TEMPERATURE: 97.1 F | HEART RATE: 80 BPM | HEIGHT: 71 IN

## 2018-12-20 DIAGNOSIS — D72.9 NEUTROPHILIC LEUKOCYTOSIS: Primary | ICD-10-CM

## 2018-12-20 PROBLEM — D72.828 NEUTROPHILIC LEUKOCYTOSIS: Status: ACTIVE | Noted: 2018-12-20

## 2018-12-20 PROCEDURE — 99204 OFFICE O/P NEW MOD 45 MIN: CPT | Performed by: INTERNAL MEDICINE

## 2018-12-20 NOTE — PROGRESS NOTES
"ID: 23 y.o. year old male from [facility] [id number]    PCP: Ericka Contreras APRN    REFERRING PHYSICIAN: TRACEE Porras    Reason for Consultation: Leukocytosis    Dear Ms. Contreras    It is a pleasure to meet Mr. Cheung today.  Is a very pleasant 23-year-old gentleman who presents today for consultation due to mild leukocytosis.  He reports that he was fairly obese central he lost almost 50 pounds over the last year by changing his lifestyle.  He's been diagnosed with hyper cholesterol, diabetes and with his central obesity he would make criteria for metabolic syndrome.  He denies any infection issues.  Clinically otherwise doing well.  He does not smoke.      Past Medical History:   Diagnosis Date   • Anxiety    • AVM (arteriovenous malformation)    • Constipation    • Depression    • Diabetes mellitus (CMS/HCC)    • Elevated cholesterol    • Headache     REPORTS RELATED TO HTN   • History of coronary angiogram     REPORTS DONE AROUND 2016 AND 2017 AND THAT HE WAS DIAGNOSED WITH AVM   • Hypertension    • Kidney infection    • Pericarditis     REPORTS HE HAS A MILD CASE AROUND 2016 OR 2017   • Rectal bleeding    • Sleep apnea     REPORTS DIAGNOSED AROUND THE AGE OF 6 AND THAT HE WAS RETESTED AROUND THE AGE OF 14 AND WAS TOLD THAT HE \"OUTGREW IT\"   • Tattoos    • Wears glasses     RX GLASSES       Past Surgical History:   Procedure Laterality Date   • COLONOSCOPY N/A 12/4/2018    Procedure: COLONOSCOPY WITH COLD BIOPSY;  Surgeon: Denny Bellamy MD;  Location: [facility] ENDOSCOPY;  Service: Gastroenterology   • EAR TUBES     • EYE MUSCLE SURGERY Bilateral    • TONSILLECTOMY AND ADENOIDECTOMY         Social History     Socioeconomic History   • Marital status: Single     Spouse name: Not on file   • Number of children: Not on file   • Years of education: Not on file   • Highest education level: Not on file   Tobacco Use   • Smoking status: Never Smoker   • Smokeless tobacco: Never Used   Substance and Sexual Activity   • " Alcohol use: Yes     Comment: OCCASSION   • Drug use: No   • Sexual activity: Defer       Family History   Problem Relation Age of Onset   • Stroke Father    • Hypertension Father    • Hyperlipidemia Father    • Cancer Father    • Hypertension Mother    • Hyperlipidemia Mother    • Cancer Maternal Uncle    • Heart attack Paternal Uncle    • Hyperlipidemia Paternal Uncle    • Hypertension Paternal Uncle    • Cancer Paternal Uncle        Review of Systems:    16 point review of systems was performed and reviewed and scanned into the EMR    Review of Systems - Oncology      Current Outpatient Medications:   •  amLODIPine (NORVASC) 10 MG tablet, Take 10 mg by mouth Daily., Disp: , Rfl:   •  CloNIDine (CATAPRES) 0.1 MG tablet, Take 1 tablet by mouth Every 6 (Six) Hours. PRN for SBP>150 mm Hg and/or DBP> 100 mm Hg Take 2 tablets PO as needed for SBP> 200 mm Hg (Patient taking differently: Take 0.1 mg by mouth Take As Directed. PRN for SBP>150 mm Hg and/or DBP> 100 mm Hg Take 2 tablets PO as needed for SBP> 200 mm Hg), Disp: 30 tablet, Rfl: 11  •  FLUoxetine (PROzac) 20 MG capsule, Take 20 mg by mouth Daily., Disp: , Rfl:   •  glipiZIDE (GLUCOTROL XL) 5 MG ER tablet, Take 5 mg by mouth Daily., Disp: , Rfl:   •  lisinopril-hydrochlorothiazide (PRINZIDE,ZESTORETIC) 20-25 MG per tablet, Take 2 tablets by mouth Daily., Disp: , Rfl:   •  methylcellulose oral powder, Take 2 g by mouth 3 (Three) Times a Day., Disp: , Rfl:   •  polyethylene glycol (MIRALAX) packet, Take 17 g by mouth Daily As Needed (constipation). (Patient taking differently: Take 17 g by mouth Take As Directed. PER COLON PREP INSTRUCTIONS), Disp: 119 g, Rfl: 0  •  pravastatin (PRAVACHOL) 40 MG tablet, Take 40 mg by mouth Daily., Disp: , Rfl:     Allergies   Allergen Reactions   • Metformin GI Intolerance       ECOG SCORE: 0    Objective     Vitals:    12/20/18 1059   BP: 175/83   Pulse: 80   Resp: 21   Temp: 97.1 °F (36.2 °C)       Physical Exam    General: well  appearing, in no acute distress  HEENT: sclera anicteric, oropharynx clear, neck is supple  Lymphatics: no cervical, supraclavicular, or axillary adenopathy  Cardiovascular: regular rate and rhythm, no murmurs, rubs or gallops  Lungs: clear to auscultation bilaterally  Abdomen: soft, nontender, nondistended.  No palpable organomegaly  Extremities: no lower extremity edema  Skin: no rashes, lesions, bruising, or petechiae  Msk:  Shows no weakness of the large muscle groups  Psych: Mood is stable      Lab Results   Component Value Date    BUN 14 11/10/2015    CREATININE 1.0 11/10/2015     11/10/2015    K 3.6 11/10/2015     11/10/2015    CO2 28 11/10/2015    CALCIUM 9.3 11/10/2015    PROTEINTOT 6.8 11/10/2015    ALBUMIN 3.9 11/10/2015    BILITOT 0.7 11/10/2015    ALKPHOS 100 11/10/2015    AST 24 11/10/2015    ALT 48 11/10/2015       Lab Results   Component Value Date    HGB 15.4 11/10/2015    HCT 46 11/10/2015    MCV 91.9 11/10/2015     11/10/2015    WBC 16.8 (H) 11/10/2015    NEUTROABS 13.75 (H) 11/10/2015    LYMPHSABS 1.85 11/10/2015    MONOSABS 1.05 (H) 11/10/2015    EOSABS 0.02 11/10/2015    BASOSABS 0.09 11/10/2015       Mri Angiogram Head Without Contrast    Result Date: 11/9/2018  Unremarkable MRA of the brain.  This report was finalized on 11/9/2018 1:10 PM by J Luis Monsivais M.D..    Us Renal Artery Complete    Result Date: 11/9/2018  No sonographic evidence of renal artery stenosis.  This report was finalized on 11/9/2018 1:17 PM by J Luis Monsivais M.D..      ASSESSMENT:    1.  Neutrophilic leukocytosis.  This is likely reactive.  I suspect it's due to his metabolic syndrome.  I spoke to him regarding following a low carbohydrate lifestyle.  I think that would be the most apt choice for him.  Metabolic syndrome can result in significant inflammation which causes mild leukocytosis.  I don't feel that he has an infection or malignancy causing this.  I will recheck him in 6 months to  make sure.  At this time no further intervention necessary other than educating him on the cause of his elevated white blood cell count and discussing lifestyle choices going forward.      Thank you for allowing me to participate in the care of this patient.    Yours sincerely,    Teressa Echavarria MD  [facility]  Hematology and Oncology    Return on: 07/18/19  Return in (Approximately): 6 months    Orders Placed This Encounter   Procedures   • CBC & Differential     Standing Status:   Future     Standing Expiration Date:   12/20/2019     Order Specific Question:   Manual Differential     Answer:   No         Please note that portions of this note may have been completed with a voice recognition program. Efforts were made to edit the dictations, but occasionally words are mistranscribed.

## 2019-02-09 ENCOUNTER — APPOINTMENT (OUTPATIENT)
Dept: GENERAL RADIOLOGY | Facility: HOSPITAL | Age: 24
End: 2019-02-09

## 2019-02-09 ENCOUNTER — HOSPITAL ENCOUNTER (EMERGENCY)
Facility: HOSPITAL | Age: 24
Discharge: HOME | End: 2019-02-09
Attending: EMERGENCY MEDICINE | Admitting: EMERGENCY MEDICINE

## 2019-02-09 VITALS
TEMPERATURE: 98 F | WEIGHT: 315 LBS | OXYGEN SATURATION: 99 % | HEIGHT: 71 IN | DIASTOLIC BLOOD PRESSURE: 88 MMHG | HEART RATE: 99 BPM | SYSTOLIC BLOOD PRESSURE: 150 MMHG | BODY MASS INDEX: 44.1 KG/M2 | RESPIRATION RATE: 18 BRPM

## 2019-02-09 DIAGNOSIS — M79.672 LEFT FOOT PAIN: Primary | ICD-10-CM

## 2019-02-09 PROCEDURE — 73630 X-RAY EXAM OF FOOT: CPT

## 2019-02-09 PROCEDURE — 99282 EMERGENCY DEPT VISIT SF MDM: CPT

## 2019-02-09 NOTE — ED PROVIDER NOTES
"Subjective   23-year-old male presents with left foot pain for several days, he's had this in the past, usually flares up intermittently.  No known injury.  It hurts on the top of his foot, the pain is worse to touch and with movement        History provided by:  Patient   used: No        Review of Systems   Musculoskeletal:        Left foot pain   All other systems reviewed and are negative.      Past Medical History:   Diagnosis Date   • Anxiety    • AVM (arteriovenous malformation)    • Constipation    • Depression    • Diabetes mellitus (CMS/HCC)    • Elevated cholesterol    • Headache     REPORTS RELATED TO HTN   • History of coronary angiogram     REPORTS DONE AROUND 2016 AND 2017 AND THAT HE WAS DIAGNOSED WITH AVM   • Hypertension    • Kidney infection    • Pericarditis     REPORTS HE HAS A MILD CASE AROUND 2016 OR 2017   • Rectal bleeding    • Sleep apnea     REPORTS DIAGNOSED AROUND THE AGE OF 6 AND THAT HE WAS RETESTED AROUND THE AGE OF 14 AND WAS TOLD THAT HE \"OUTGREW IT\"   • Tattoos    • Wears glasses     RX GLASSES       Allergies   Allergen Reactions   • Metformin GI Intolerance       Past Surgical History:   Procedure Laterality Date   • COLONOSCOPY N/A 12/4/2018    Procedure: COLONOSCOPY WITH COLD BIOPSY;  Surgeon: Denny Bellamy MD;  Location: [facility] ENDOSCOPY;  Service: Gastroenterology   • EAR TUBES     • EYE MUSCLE SURGERY Bilateral    • TONSILLECTOMY AND ADENOIDECTOMY         Family History   Problem Relation Age of Onset   • Stroke Father    • Hypertension Father    • Hyperlipidemia Father    • Cancer Father    • Hypertension Mother    • Hyperlipidemia Mother    • Cancer Maternal Uncle    • Heart attack Paternal Uncle    • Hyperlipidemia Paternal Uncle    • Hypertension Paternal Uncle    • Cancer Paternal Uncle        Social History     Socioeconomic History   • Marital status: Single     Spouse name: Not on file   • Number of children: Not on file   • Years of education: " Not on file   • Highest education level: Not on file   Tobacco Use   • Smoking status: Never Smoker   • Smokeless tobacco: Never Used   Substance and Sexual Activity   • Alcohol use: Yes     Comment: OCCASSION   • Drug use: No   • Sexual activity: Defer           Objective   Physical Exam   Constitutional: He is oriented to person, place, and time. He appears well-developed and well-nourished.   HENT:   Head: Normocephalic and atraumatic.   Eyes: EOM are normal. Pupils are equal, round, and reactive to light.   Neck: Normal range of motion.   Cardiovascular: Normal rate and regular rhythm.   Pulmonary/Chest: Effort normal and breath sounds normal.   Abdominal: Soft. Bowel sounds are normal.   Musculoskeletal: Normal range of motion. He exhibits tenderness. He exhibits no edema or deformity.   Neurological: He is alert and oriented to person, place, and time.   Skin: Skin is warm and dry.   Psychiatric: He has a normal mood and affect. His behavior is normal. Judgment and thought content normal.   Nursing note and vitals reviewed.      Procedures           ED Course  ED Course as of Feb 09 1703   Sat Feb 09, 2019   1644 Discussed the case with Dr. Alejo, he committed putting the patient in a looped and following up, to evaluate between poiKalosis versus some type of metastasis.  I discussed this with the patient and recommended close follow-up  [CS]      ED Course User Index  [CS] Leo West Jr., BARBY                  MDM  Number of Diagnoses or Management Options  Left foot pain: new and requires workup     Amount and/or Complexity of Data Reviewed  Tests in the radiology section of CPT®: reviewed  Review and summarize past medical records: yes    Risk of Complications, Morbidity, and/or Mortality  Presenting problems: minimal  Diagnostic procedures: low  Management options: low    Patient Progress  Patient progress: stable        Final diagnoses:   Left foot pain            Leo West Jr.,  BARBY  02/09/19 1701

## 2019-02-12 ENCOUNTER — OFFICE VISIT (OUTPATIENT)
Dept: ORTHOPEDIC SURGERY | Facility: CLINIC | Age: 24
End: 2019-02-12

## 2019-02-12 VITALS — BODY MASS INDEX: 44.1 KG/M2 | HEIGHT: 71 IN | WEIGHT: 315 LBS | RESPIRATION RATE: 18 BRPM

## 2019-02-12 DIAGNOSIS — S92.325A CLOSED NONDISPLACED FRACTURE OF SECOND METATARSAL BONE OF LEFT FOOT, INITIAL ENCOUNTER: ICD-10-CM

## 2019-02-12 DIAGNOSIS — M19.079 ARTHRITIS OF FOOT: Primary | ICD-10-CM

## 2019-02-12 DIAGNOSIS — Z79.4 TYPE 2 DIABETES MELLITUS WITH COMPLICATION, WITH LONG-TERM CURRENT USE OF INSULIN (HCC): ICD-10-CM

## 2019-02-12 DIAGNOSIS — E11.8 TYPE 2 DIABETES MELLITUS WITH COMPLICATION, WITH LONG-TERM CURRENT USE OF INSULIN (HCC): ICD-10-CM

## 2019-02-12 PROCEDURE — 99203 OFFICE O/P NEW LOW 30 MIN: CPT | Performed by: PODIATRIST

## 2019-02-12 RX ORDER — RANITIDINE 150 MG/1
TABLET ORAL
COMMUNITY
Start: 2019-01-15 | End: 2022-08-18

## 2019-02-12 RX ORDER — MELOXICAM 7.5 MG/1
7.5 TABLET ORAL DAILY
Qty: 30 TABLET | Refills: 0 | Status: SHIPPED | OUTPATIENT
Start: 2019-02-12 | End: 2022-08-18

## 2019-02-12 RX ORDER — PSYLLIUM HUSK 3.4G/5.8G
POWDER (GRAM) ORAL
COMMUNITY
Start: 2018-11-26 | End: 2019-12-12

## 2019-02-12 RX ORDER — LANCETS 33 GAUGE
EACH MISCELLANEOUS
COMMUNITY
Start: 2019-01-07

## 2019-02-12 NOTE — PROGRESS NOTES
"Subjective   Patient ID: Ken Cheung is a 23 y.o. male   Pain of the Left Foot  Patient presents to the office today after presenting to the emergency department over the weekend with a chief complaint of left foot and ankle pain.  He denies any recent injury.  Says about a month ago he was with his family and his knee screened and he went running down the steps to check on her and kind of twisted his ankle.  He says this hurt for a day or 2 but Better.  He says he's had issues in the past with his foot hurting for a day or 2 and then improves and gets better but now this been going on for over a week.  He says he's been told the child that he had osteopoikilosis but it was never evaluated and worked up.  He was given a boot from the emergency department but is wearing regular tennis shoes today.    History of Present Illness    Pain Score: 7  Pain Location: Foot  Pain Orientation: Left     Pain Descriptors: Aching, Burning  Pain Frequency: Intermittent  Pain Onset: Gradual  Date Pain First Started: 02/04/19     Aggravating Factors: Standing, Walking        Pain Intervention(s): Home medication, Rest  Result of Injury: No       Review of Systems   Constitutional: Negative.    Respiratory: Negative.    Gastrointestinal: Negative.    Musculoskeletal: Positive for arthralgias (left foot).   Skin: Negative.        Past Medical History:   Diagnosis Date   • Anxiety    • AVM (arteriovenous malformation)    • Constipation    • Depression    • Diabetes mellitus (CMS/HCC)    • Elevated cholesterol    • Headache     REPORTS RELATED TO HTN   • History of coronary angiogram     REPORTS DONE AROUND 2016 AND 2017 AND THAT HE WAS DIAGNOSED WITH AVM   • Hypertension    • Kidney infection    • Pericarditis     REPORTS HE HAS A MILD CASE AROUND 2016 OR 2017   • Rectal bleeding    • Sleep apnea     REPORTS DIAGNOSED AROUND THE AGE OF 6 AND THAT HE WAS RETESTED AROUND THE AGE OF 14 AND WAS TOLD THAT HE \"OUTGREW IT\"   • " Tattoos    • Wears glasses     RX GLASSES        Past Surgical History:   Procedure Laterality Date   • COLONOSCOPY N/A 12/4/2018    Procedure: COLONOSCOPY WITH COLD BIOPSY;  Surgeon: Denny Bellamy MD;  Location: [facility] ENDOSCOPY;  Service: Gastroenterology   • EAR TUBES     • EYE MUSCLE SURGERY Bilateral    • TONSILLECTOMY AND ADENOIDECTOMY         Allergies   Allergen Reactions   • Metformin GI Intolerance         Current Outpatient Medications:   •  amLODIPine (NORVASC) 10 MG tablet, Take 10 mg by mouth Daily., Disp: , Rfl:   •  CloNIDine (CATAPRES) 0.1 MG tablet, Take 1 tablet by mouth Every 6 (Six) Hours. PRN for SBP>150 mm Hg and/or DBP> 100 mm Hg Take 2 tablets PO as needed for SBP> 200 mm Hg (Patient taking differently: Take 0.1 mg by mouth Take As Directed. PRN for SBP>150 mm Hg and/or DBP> 100 mm Hg Take 2 tablets PO as needed for SBP> 200 mm Hg), Disp: 30 tablet, Rfl: 11  •  FLUoxetine (PROzac) 20 MG capsule, Take 20 mg by mouth Daily., Disp: , Rfl:   •  glipiZIDE (GLUCOTROL XL) 5 MG ER tablet, Take 5 mg by mouth Daily., Disp: , Rfl:   •  lisinopril-hydrochlorothiazide (PRINZIDE,ZESTORETIC) 20-25 MG per tablet, Take 2 tablets by mouth Daily., Disp: , Rfl:   •  meloxicam (MOBIC) 7.5 MG tablet, Take 1 tablet by mouth Daily., Disp: 30 tablet, Rfl: 0  •  methylcellulose oral powder, Take 2 g by mouth 3 (Three) Times a Day., Disp: , Rfl:   •  ONETOUCH DELICA LANCETS 33G misc, , Disp: , Rfl:   •  polyethylene glycol (MIRALAX) packet, Take 17 g by mouth Daily As Needed (constipation). (Patient taking differently: Take 17 g by mouth Take As Directed. PER COLON PREP INSTRUCTIONS), Disp: 119 g, Rfl: 0  •  pravastatin (PRAVACHOL) 40 MG tablet, Take 40 mg by mouth Daily., Disp: , Rfl:   •  RA MULTIHEALTH FIBER 58.6 % powder, , Disp: , Rfl:   •  raNITIdine (ZANTAC) 150 MG tablet, , Disp: , Rfl:     Family History   Problem Relation Age of Onset   • Stroke Father    • Hypertension Father    • Hyperlipidemia Father     • Cancer Father    • Hypertension Mother    • Hyperlipidemia Mother    • Cancer Maternal Uncle    • Heart attack Paternal Uncle    • Hyperlipidemia Paternal Uncle    • Hypertension Paternal Uncle    • Cancer Paternal Uncle        Social History     Socioeconomic History   • Marital status: Single     Spouse name: Not on file   • Number of children: Not on file   • Years of education: Not on file   • Highest education level: Not on file   Social Needs   • Financial resource strain: Not on file   • Food insecurity - worry: Not on file   • Food insecurity - inability: Not on file   • Transportation needs - medical: Not on file   • Transportation needs - non-medical: Not on file   Occupational History   • Not on file   Tobacco Use   • Smoking status: Never Smoker   • Smokeless tobacco: Never Used   Substance and Sexual Activity   • Alcohol use: Yes     Comment: OCCASSION   • Drug use: No   • Sexual activity: Defer   Other Topics Concern   • Not on file   Social History Narrative   • Not on file       Counseling given: No       I have reviewed all of the above social hx, family hx, surgical hx, medications, allergies & ROS and confirm that it is accurate.  Objective   Physical Exam   Constitutional: He is oriented to person, place, and time. He appears well-developed and well-nourished.   HENT:   Head: Normocephalic and atraumatic.   Nose: Nose normal.   Eyes: Conjunctivae and EOM are normal. Pupils are equal, round, and reactive to light.   Neck: Normal range of motion.   Cardiovascular: Normal rate.   Pulmonary/Chest: Effort normal.   Neurological: He is alert and oriented to person, place, and time.   Skin: Skin is warm.   Psychiatric: He has a normal mood and affect. His behavior is normal. Judgment and thought content normal.   Nursing note and vitals reviewed.    Ortho Exam  [unfilled]left  Lower extremity exam:  Vascular: Pulses palpable, pedal hair noted, CFT brisk, mild edema noted  Neuro: Gross and  protective sensation intact.  Derm: Normal turgor and temperature.  No wounds or sores or lesions.  MSK: His tender to palpation of the anterior ankle joint.  He is also very tender to palpation over the second, third, fourth metatarsal shafts.  Active range of motion is intact.  Passive range of motion is normal.  Resisted manual muscle testing is slightly painful.  Both manual muscle testing is normal.    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  No new comparisons available.  X-rays show multiple bone islands in the foot and ankle consistent with osteopoikilosis.  This is also present in previous shoulder and chest x-rays.  No obvious acute fracture injury noted.  He does have multiple areas of lucent lesions throughout the metatarsals as well aside from the bone islands.  Laboratory and Other Studies:     Medical Decision Making:        Procedures  [] No procedures were performed in office today.    Ken was seen today for pain.    Diagnoses and all orders for this visit:    Arthritis of foot    Type 2 diabetes mellitus with complication, with long-term current use of insulin (CMS/[facility])    Closed nondisplaced fracture of second metatarsal bone of left foot, initial encounter    Other orders  -     meloxicam (MOBIC) 7.5 MG tablet; Take 1 tablet by mouth Daily.          Recommendations/Plan:  I reviewed his x-rays with him and in my opinion this seems to be mostly consistent with a stress fractures or stress reactions of the metatarsals.  Aside from the bone islands he does have several lucent areas which may very well be weak points within his metatarsal shafts and given his activity level and weight this could very easily calls a stress reaction.  We discussed the underlying bone pathology and I'm not overly concerned with metastasis or cancer given  that he says he's had this since he was a child.  If this were to persist we would considers gaining such as bone scan and MRI but for now I recommend he wear his boot to offload and rest the foot.  I recommend once daily anti-inflammatories and compression for the swelling along with icing when he can.  I'll see him back in 2 or 3 weeks and if no improvement may consider further imaging.  He states his recently lost 60-80 pounds and his A1c has improved and he is doing much better with his overall health but it's taken some time.    Return in about 3 weeks (around 3/5/2019).  Patient agreeable to call or return sooner for any concerns.

## 2019-10-13 ENCOUNTER — HOSPITAL ENCOUNTER (EMERGENCY)
Facility: HOSPITAL | Age: 24
Discharge: HOME | End: 2019-10-13
Attending: EMERGENCY MEDICINE | Admitting: EMERGENCY MEDICINE

## 2019-10-13 VITALS
HEART RATE: 100 BPM | DIASTOLIC BLOOD PRESSURE: 94 MMHG | OXYGEN SATURATION: 98 % | TEMPERATURE: 97.8 F | HEIGHT: 70 IN | RESPIRATION RATE: 18 BRPM | WEIGHT: 315 LBS | SYSTOLIC BLOOD PRESSURE: 151 MMHG | BODY MASS INDEX: 45.1 KG/M2

## 2019-10-13 DIAGNOSIS — I10 ESSENTIAL HYPERTENSION: Primary | ICD-10-CM

## 2019-10-13 LAB — GLUCOSE BLDC GLUCOMTR-MCNC: 219 MG/DL (ref 70–130)

## 2019-10-13 PROCEDURE — 82962 GLUCOSE BLOOD TEST: CPT

## 2019-10-13 PROCEDURE — 99284 EMERGENCY DEPT VISIT MOD MDM: CPT

## 2019-10-13 PROCEDURE — 93005 ELECTROCARDIOGRAM TRACING: CPT | Performed by: EMERGENCY MEDICINE

## 2019-10-13 RX ORDER — CLONIDINE HYDROCHLORIDE 0.2 MG/1
0.2 TABLET ORAL ONCE
Status: COMPLETED | OUTPATIENT
Start: 2019-10-13 | End: 2019-10-13

## 2019-10-13 RX ADMIN — CLONIDINE HYDROCHLORIDE 0.2 MG: 0.2 TABLET ORAL at 21:14

## 2019-10-14 NOTE — ED PROVIDER NOTES
"Subjective   23-year-old male presents with retention.  He did not take his medications this morning, he is been on blood pressure medicine since he was 8 years old.  He missed the dose today, he reports that he had a headache earlier today, and noticed his blood pressure was up.  No chest pain no shortness of breath        History provided by:  Patient   used: No        Review of Systems   Cardiovascular:        Htn   All other systems reviewed and are negative.      Past Medical History:   Diagnosis Date   • Anxiety    • AVM (arteriovenous malformation)    • Constipation    • Depression    • Diabetes mellitus (CMS/HCC)    • Elevated cholesterol    • Headache     REPORTS RELATED TO HTN   • History of coronary angiogram     REPORTS DONE AROUND 2016 AND 2017 AND THAT HE WAS DIAGNOSED WITH AVM   • Hypertension    • Kidney infection    • Pericarditis     REPORTS HE HAS A MILD CASE AROUND 2016 OR 2017   • Rectal bleeding    • Sleep apnea     REPORTS DIAGNOSED AROUND THE AGE OF 6 AND THAT HE WAS RETESTED AROUND THE AGE OF 14 AND WAS TOLD THAT HE \"OUTGREW IT\"   • Tattoos    • Wears glasses     RX GLASSES       Allergies   Allergen Reactions   • Metformin GI Intolerance       Past Surgical History:   Procedure Laterality Date   • COLONOSCOPY N/A 12/4/2018    Procedure: COLONOSCOPY WITH COLD BIOPSY;  Surgeon: Denny Bellamy MD;  Location: [facility] ENDOSCOPY;  Service: Gastroenterology   • EAR TUBES     • EYE MUSCLE SURGERY Bilateral    • TONSILLECTOMY AND ADENOIDECTOMY         Family History   Problem Relation Age of Onset   • Stroke Father    • Hypertension Father    • Hyperlipidemia Father    • Cancer Father    • Hypertension Mother    • Hyperlipidemia Mother    • Cancer Maternal Uncle    • Heart attack Paternal Uncle    • Hyperlipidemia Paternal Uncle    • Hypertension Paternal Uncle    • Cancer Paternal Uncle        Social History     Socioeconomic History   • Marital status: Single     Spouse name: " Not on file   • Number of children: Not on file   • Years of education: Not on file   • Highest education level: Not on file   Tobacco Use   • Smoking status: Never Smoker   • Smokeless tobacco: Never Used   Substance and Sexual Activity   • Alcohol use: Yes     Comment: OCCASSION   • Drug use: No   • Sexual activity: Defer           Objective   Physical Exam   Constitutional: He is oriented to person, place, and time. He appears well-developed and well-nourished.   HENT:   Head: Normocephalic and atraumatic.   Eyes: EOM are normal. Pupils are equal, round, and reactive to light.   Neck: Normal range of motion.   Cardiovascular: Normal rate and regular rhythm.   Pulmonary/Chest: Effort normal and breath sounds normal.   Abdominal: Soft. Bowel sounds are normal.   Musculoskeletal: Normal range of motion.   Neurological: He is alert and oriented to person, place, and time.   Skin: Skin is warm and dry.   Psychiatric: He has a normal mood and affect. His behavior is normal.   Nursing note and vitals reviewed.      Procedures           ED Course  ED Course as of Oct 13 2159   Sun Oct 13, 2019   2113 EKG interpreted by me: sinus rhythm, normal rate, no acute ST/T changes, poor R wave progression, this is an abnormal EKG, similar to previous  [MP]      ED Course User Index  [MP] Bello Hoover MD                  MDM  Number of Diagnoses or Management Options  Essential hypertension: new and requires workup  Risk of Complications, Morbidity, and/or Mortality  Presenting problems: minimal  Management options: minimal    Patient Progress  Patient progress: stable      Final diagnoses:   Essential hypertension              Leo West Jr., BARBY  10/13/19 2155

## 2019-10-22 ENCOUNTER — TELEPHONE (OUTPATIENT)
Dept: CARDIOLOGY | Facility: CLINIC | Age: 24
End: 2019-10-22

## 2019-10-23 ENCOUNTER — APPOINTMENT (OUTPATIENT)
Dept: CT IMAGING | Facility: HOSPITAL | Age: 24
End: 2019-10-23

## 2019-10-23 ENCOUNTER — HOSPITAL ENCOUNTER (EMERGENCY)
Facility: HOSPITAL | Age: 24
Discharge: HOME | End: 2019-10-23
Attending: EMERGENCY MEDICINE | Admitting: EMERGENCY MEDICINE

## 2019-10-23 VITALS
HEART RATE: 88 BPM | WEIGHT: 315 LBS | RESPIRATION RATE: 18 BRPM | BODY MASS INDEX: 44.1 KG/M2 | TEMPERATURE: 97.8 F | DIASTOLIC BLOOD PRESSURE: 102 MMHG | HEIGHT: 71 IN | OXYGEN SATURATION: 97 % | SYSTOLIC BLOOD PRESSURE: 156 MMHG

## 2019-10-23 DIAGNOSIS — N20.0 KIDNEY STONE: Primary | ICD-10-CM

## 2019-10-23 LAB
ALBUMIN SERPL-MCNC: 4.4 G/DL (ref 3.5–5.2)
ALBUMIN/GLOB SERPL: 1.2 G/DL
ALP SERPL-CCNC: 122 U/L (ref 39–117)
ALT SERPL W P-5'-P-CCNC: 69 U/L (ref 1–41)
ANION GAP SERPL CALCULATED.3IONS-SCNC: 17.5 MMOL/L (ref 5–15)
AST SERPL-CCNC: 39 U/L (ref 1–40)
BACTERIA UR QL AUTO: ABNORMAL /HPF
BASOPHILS # BLD AUTO: 0.13 10*3/MM3 (ref 0–0.2)
BASOPHILS NFR BLD AUTO: 1 % (ref 0–1.5)
BILIRUB SERPL-MCNC: 0.3 MG/DL (ref 0.2–1.2)
BILIRUB UR QL STRIP: NEGATIVE
BUN BLD-MCNC: 15 MG/DL (ref 6–20)
BUN/CREAT SERPL: 15.6 (ref 7–25)
CALCIUM SPEC-SCNC: 9.9 MG/DL (ref 8.6–10.5)
CHLORIDE SERPL-SCNC: 95 MMOL/L (ref 98–107)
CLARITY UR: ABNORMAL
CO2 SERPL-SCNC: 24.5 MMOL/L (ref 22–29)
COLOR UR: ABNORMAL
CREAT BLD-MCNC: 0.96 MG/DL (ref 0.76–1.27)
DEPRECATED RDW RBC AUTO: 40.2 FL (ref 37–54)
EOSINOPHIL # BLD AUTO: 0.19 10*3/MM3 (ref 0–0.4)
EOSINOPHIL NFR BLD AUTO: 1.5 % (ref 0.3–6.2)
ERYTHROCYTE [DISTWIDTH] IN BLOOD BY AUTOMATED COUNT: 12.1 % (ref 12.3–15.4)
GFR SERPL CREATININE-BSD FRML MDRD: 95 ML/MIN/1.73
GLOBULIN UR ELPH-MCNC: 3.8 GM/DL
GLUCOSE BLD-MCNC: 169 MG/DL (ref 65–99)
GLUCOSE UR STRIP-MCNC: NEGATIVE MG/DL
HCT VFR BLD AUTO: 49.1 % (ref 37.5–51)
HGB BLD-MCNC: 16.4 G/DL (ref 13–17.7)
HGB UR QL STRIP.AUTO: ABNORMAL
HYALINE CASTS UR QL AUTO: ABNORMAL /LPF
IMM GRANULOCYTES # BLD AUTO: 0.05 10*3/MM3 (ref 0–0.05)
IMM GRANULOCYTES NFR BLD AUTO: 0.4 % (ref 0–0.5)
KETONES UR QL STRIP: NEGATIVE
LEUKOCYTE ESTERASE UR QL STRIP.AUTO: ABNORMAL
LYMPHOCYTES # BLD AUTO: 4.94 10*3/MM3 (ref 0.7–3.1)
LYMPHOCYTES NFR BLD AUTO: 38 % (ref 19.6–45.3)
MCH RBC QN AUTO: 30.4 PG (ref 26.6–33)
MCHC RBC AUTO-ENTMCNC: 33.4 G/DL (ref 31.5–35.7)
MCV RBC AUTO: 90.9 FL (ref 79–97)
MONOCYTES # BLD AUTO: 1.02 10*3/MM3 (ref 0.1–0.9)
MONOCYTES NFR BLD AUTO: 7.8 % (ref 5–12)
NEUTROPHILS # BLD AUTO: 6.67 10*3/MM3 (ref 1.7–7)
NEUTROPHILS NFR BLD AUTO: 51.3 % (ref 42.7–76)
NITRITE UR QL STRIP: NEGATIVE
NRBC BLD AUTO-RTO: 0 /100 WBC (ref 0–0.2)
PH UR STRIP.AUTO: <=5 [PH] (ref 5–8)
PLATELET # BLD AUTO: 374 10*3/MM3 (ref 140–450)
PMV BLD AUTO: 9.8 FL (ref 6–12)
POTASSIUM BLD-SCNC: 3.7 MMOL/L (ref 3.5–5.2)
PROT SERPL-MCNC: 8.2 G/DL (ref 6–8.5)
PROT UR QL STRIP: ABNORMAL
RBC # BLD AUTO: 5.4 10*6/MM3 (ref 4.14–5.8)
RBC # UR: ABNORMAL /HPF
REF LAB TEST METHOD: ABNORMAL
SODIUM BLD-SCNC: 137 MMOL/L (ref 136–145)
SP GR UR STRIP: 1.03 (ref 1–1.03)
SQUAMOUS #/AREA URNS HPF: ABNORMAL /HPF
UROBILINOGEN UR QL STRIP: ABNORMAL
WBC NRBC COR # BLD: 13 10*3/MM3 (ref 3.4–10.8)
WBC UR QL AUTO: ABNORMAL /HPF

## 2019-10-23 PROCEDURE — 74176 CT ABD & PELVIS W/O CONTRAST: CPT

## 2019-10-23 PROCEDURE — 81001 URINALYSIS AUTO W/SCOPE: CPT | Performed by: EMERGENCY MEDICINE

## 2019-10-23 PROCEDURE — 99283 EMERGENCY DEPT VISIT LOW MDM: CPT

## 2019-10-23 PROCEDURE — 80053 COMPREHEN METABOLIC PANEL: CPT | Performed by: EMERGENCY MEDICINE

## 2019-10-23 PROCEDURE — 85025 COMPLETE CBC W/AUTO DIFF WBC: CPT | Performed by: EMERGENCY MEDICINE

## 2019-10-23 RX ORDER — HYDROCODONE BITARTRATE AND ACETAMINOPHEN 5; 325 MG/1; MG/1
1 TABLET ORAL EVERY 6 HOURS PRN
Qty: 10 TABLET | Refills: 0 | OUTPATIENT
Start: 2019-10-23 | End: 2019-11-04

## 2019-10-23 RX ORDER — NITROFURANTOIN 25; 75 MG/1; MG/1
100 CAPSULE ORAL 2 TIMES DAILY
COMMUNITY
End: 2019-12-12

## 2019-10-23 RX ORDER — ONDANSETRON 4 MG/1
4 TABLET, ORALLY DISINTEGRATING ORAL EVERY 6 HOURS PRN
Qty: 10 TABLET | Refills: 0 | Status: SHIPPED | OUTPATIENT
Start: 2019-10-23 | End: 2019-12-12

## 2019-10-23 RX ADMIN — SODIUM CHLORIDE 1000 ML: 9 INJECTION, SOLUTION INTRAVENOUS at 20:16

## 2019-10-24 DIAGNOSIS — N20.0 KIDNEY STONE: Primary | ICD-10-CM

## 2019-10-24 NOTE — ED PROVIDER NOTES
"Subjective   23-year-old male presenting with multiple complaints.  He states that for the last few days he said intermittent hematuria, dysuria, flank pain bilaterally.  Has felt generally unwell, fatigued.  Denies any chest pain, shortness of breath, nausea, vomiting, abdominal pain.  He went to his primary care doctor today, was told he had a urinary tract infection and that he had a large amount of ketones in his urine and he needed to come here to be evaluated.  He is a diabetic and takes glipizide.            Review of Systems   Constitutional: Positive for fatigue.   HENT: Negative.    Eyes: Negative.    Respiratory: Negative.    Cardiovascular: Negative.    Gastrointestinal: Negative.    Genitourinary: Positive for dysuria, flank pain and hematuria.   Skin: Negative.    Neurological: Negative.    Psychiatric/Behavioral: Negative.        Past Medical History:   Diagnosis Date   • Anxiety    • AVM (arteriovenous malformation)    • Constipation    • Depression    • Diabetes mellitus (CMS/HCC)    • Elevated cholesterol    • Headache     REPORTS RELATED TO HTN   • History of coronary angiogram     REPORTS DONE AROUND 2016 AND 2017 AND THAT HE WAS DIAGNOSED WITH AVM   • Hypertension    • Kidney infection    • Pericarditis     REPORTS HE HAS A MILD CASE AROUND 2016 OR 2017   • Rectal bleeding    • Sleep apnea     REPORTS DIAGNOSED AROUND THE AGE OF 6 AND THAT HE WAS RETESTED AROUND THE AGE OF 14 AND WAS TOLD THAT HE \"OUTGREW IT\"   • Tattoos    • Wears glasses     RX GLASSES       Allergies   Allergen Reactions   • Metformin GI Intolerance       Past Surgical History:   Procedure Laterality Date   • COLONOSCOPY N/A 12/4/2018    Procedure: COLONOSCOPY WITH COLD BIOPSY;  Surgeon: Denny Bellamy MD;  Location: [facility] ENDOSCOPY;  Service: Gastroenterology   • EAR TUBES     • EYE MUSCLE SURGERY Bilateral    • TONSILLECTOMY AND ADENOIDECTOMY         Family History   Problem Relation Age of Onset   • Stroke Father    • " Hypertension Father    • Hyperlipidemia Father    • Cancer Father    • Hypertension Mother    • Hyperlipidemia Mother    • Cancer Maternal Uncle    • Heart attack Paternal Uncle    • Hyperlipidemia Paternal Uncle    • Hypertension Paternal Uncle    • Cancer Paternal Uncle        Social History     Socioeconomic History   • Marital status: Single     Spouse name: Not on file   • Number of children: Not on file   • Years of education: Not on file   • Highest education level: Not on file   Tobacco Use   • Smoking status: Never Smoker   • Smokeless tobacco: Never Used   Substance and Sexual Activity   • Alcohol use: Yes     Comment: OCCASSION   • Drug use: No   • Sexual activity: Defer           Objective   Physical Exam   Constitutional: He is oriented to person, place, and time. He appears well-developed and well-nourished. No distress.   HENT:   Head: Normocephalic and atraumatic.   Right Ear: External ear normal.   Left Ear: External ear normal.   Nose: Nose normal.   Mouth/Throat: Oropharynx is clear and moist.   Eyes: Conjunctivae and EOM are normal. Pupils are equal, round, and reactive to light.   Neck: Normal range of motion. Neck supple.   Cardiovascular: Normal rate, regular rhythm, normal heart sounds and intact distal pulses.   Pulmonary/Chest: Effort normal and breath sounds normal. No respiratory distress.   Abdominal: Soft. Bowel sounds are normal. He exhibits no distension. There is no tenderness. There is no rebound and no guarding.   No abdominal tenderness, no CVA tenderness   Musculoskeletal: Normal range of motion. He exhibits no edema, tenderness or deformity.   Neurological: He is alert and oriented to person, place, and time.   Skin: Skin is warm and dry. No rash noted.   Psychiatric: He has a normal mood and affect. His behavior is normal.   Nursing note and vitals reviewed.      Procedures           ED Course                  MDM  Number of Diagnoses or Management Options  Kidney stone:    Diagnosis management comments: 23-year-old male with multiple complaints.  Well-developed, well-nourished obese man in no distress with exam as above.  He has normal vital signs.  Ounces.  Consider stone so we will check CT scan.  Disposition pending work-up.    DDX: Stone, UTI, musculoskeletal pain, dehydration, diabetic complication    Lab work is largely unremarkable. CT scan shows 3mm right UPJ stone without hydronephrosis. UA does not look infected. I feel he is safe for discharge home. Will have him follow up with urology.       Amount and/or Complexity of Data Reviewed  Clinical lab tests: reviewed  Tests in the radiology section of CPT®: reviewed        Final diagnoses:   Kidney stone              Bello Hoover MD  10/23/19 [id number]

## 2019-10-29 ENCOUNTER — OFFICE VISIT (OUTPATIENT)
Dept: UROLOGY | Facility: CLINIC | Age: 24
End: 2019-10-29

## 2019-10-29 ENCOUNTER — HOSPITAL ENCOUNTER (OUTPATIENT)
Dept: GENERAL RADIOLOGY | Facility: HOSPITAL | Age: 24
Discharge: HOME | End: 2019-10-29
Admitting: UROLOGY

## 2019-10-29 VITALS
RESPIRATION RATE: 19 BRPM | HEIGHT: 71 IN | DIASTOLIC BLOOD PRESSURE: 90 MMHG | HEART RATE: 102 BPM | BODY MASS INDEX: 44.1 KG/M2 | OXYGEN SATURATION: 98 % | WEIGHT: 315 LBS | SYSTOLIC BLOOD PRESSURE: 142 MMHG

## 2019-10-29 DIAGNOSIS — N20.0 KIDNEY STONE: Primary | ICD-10-CM

## 2019-10-29 DIAGNOSIS — N20.0 KIDNEY STONE: ICD-10-CM

## 2019-10-29 LAB
BILIRUB BLD-MCNC: NEGATIVE MG/DL
CLARITY, POC: CLEAR
COLOR UR: YELLOW
GLUCOSE UR STRIP-MCNC: NORMAL MG/DL
KETONES UR QL: NEGATIVE
LEUKOCYTE EST, POC: NEGATIVE
NITRITE UR-MCNC: NEGATIVE MG/ML
PH UR: 6 [PH] (ref 5–8)
PROT UR STRIP-MCNC: NEGATIVE MG/DL
RBC # UR STRIP: NEGATIVE /UL
SP GR UR: 1.01 (ref 1–1.03)
UROBILINOGEN UR QL: NORMAL

## 2019-10-29 PROCEDURE — 99203 OFFICE O/P NEW LOW 30 MIN: CPT | Performed by: UROLOGY

## 2019-10-29 PROCEDURE — 74018 RADEX ABDOMEN 1 VIEW: CPT

## 2019-10-29 NOTE — PROGRESS NOTES
"Chief Complaint  Kidney Stone    HPI  Mr. Cheung is a 24 y.o. male who presents for further management of nephrolithiasis.    He presented to ER and was diagnosed with a 3mm R renal  stone. He presents today for follow up.  He is having intermittent nonradiating, dull, colicky, moderate right low back pain.  No fever, chills, nausea, vomiting.  No dysuria. He has had intermittent gross hematuria when he came into ER for the stone.     Stone related history:  Family history of kidney stones  no  Renal disease or anatomic abnormality: no  Malabsorptive disease or gastric bypass: no  Frequent UTI's    no  Parathyroid disease    no   His BMI is 51    Dietary Considerations  Soda -0 per day  Fast food -5 per week, daily  Water -\"a gallon\"  per day  Yes, adds salt to foods    Past Medical History  Past Medical History:   Diagnosis Date   • Anxiety    • AVM (arteriovenous malformation)    • Constipation    • Depression    • Diabetes mellitus (CMS/HCC)    • Elevated cholesterol    • Headache     REPORTS RELATED TO HTN   • History of coronary angiogram     REPORTS DONE AROUND 2016 AND 2017 AND THAT HE WAS DIAGNOSED WITH AVM   • Hypertension    • Kidney infection    • Pericarditis     REPORTS HE HAS A MILD CASE AROUND 2016 OR 2017   • Rectal bleeding    • Sleep apnea     REPORTS DIAGNOSED AROUND THE AGE OF 6 AND THAT HE WAS RETESTED AROUND THE AGE OF 14 AND WAS TOLD THAT HE \"OUTGREW IT\"   • Tattoos    • Wears glasses     RX GLASSES       Past Surgical History  Past Surgical History:   Procedure Laterality Date   • COLONOSCOPY N/A 12/4/2018    Procedure: COLONOSCOPY WITH COLD BIOPSY;  Surgeon: Denny Bellamy MD;  Location: [facility] ENDOSCOPY;  Service: Gastroenterology   • EAR TUBES     • EYE MUSCLE SURGERY Bilateral    • TONSILLECTOMY AND ADENOIDECTOMY         Medications    Current Outpatient Medications:   •  amLODIPine (NORVASC) 10 MG tablet, Take 10 mg by mouth Daily., Disp: , Rfl:   •  CloNIDine (CATAPRES) 0.1 MG " tablet, Take 1 tablet by mouth Every 6 (Six) Hours. PRN for SBP>150 mm Hg and/or DBP> 100 mm Hg Take 2 tablets PO as needed for SBP> 200 mm Hg (Patient taking differently: Take 0.1 mg by mouth Take As Directed. PRN for SBP>150 mm Hg and/or DBP> 100 mm Hg Take 2 tablets PO as needed for SBP> 200 mm Hg), Disp: 30 tablet, Rfl: 11  •  FLUoxetine (PROzac) 20 MG capsule, Take 60 mg by mouth Daily., Disp: , Rfl:   •  glipiZIDE (GLUCOTROL XL) 5 MG ER tablet, Take 5 mg by mouth Daily., Disp: , Rfl:   •  HYDROcodone-acetaminophen (NORCO) 5-325 MG per tablet, Take 1 tablet by mouth Every 6 (Six) Hours As Needed for Mild Pain ., Disp: 10 tablet, Rfl: 0  •  lisinopril-hydrochlorothiazide (PRINZIDE,ZESTORETIC) 20-25 MG per tablet, Take 2 tablets by mouth Daily., Disp: , Rfl:   •  meloxicam (MOBIC) 7.5 MG tablet, Take 1 tablet by mouth Daily., Disp: 30 tablet, Rfl: 0  •  methylcellulose oral powder, Take 2 g by mouth 3 (Three) Times a Day., Disp: , Rfl:   •  nitrofurantoin, macrocrystal-monohydrate, (MACROBID) 100 MG capsule, Take 100 mg by mouth 2 (Two) Times a Day., Disp: , Rfl:   •  ondansetron ODT (ZOFRAN-ODT) 4 MG disintegrating tablet, Take 1 tablet by mouth Every 6 (Six) Hours As Needed for Nausea or Vomiting., Disp: 10 tablet, Rfl: 0  •  ONETOUCH DELICA LANCETS 33G misc, , Disp: , Rfl:   •  polyethylene glycol (MIRALAX) packet, Take 17 g by mouth Daily As Needed (constipation). (Patient taking differently: Take 17 g by mouth Take As Directed. PER COLON PREP INSTRUCTIONS), Disp: 119 g, Rfl: 0  •  pravastatin (PRAVACHOL) 40 MG tablet, Take 40 mg by mouth Daily., Disp: , Rfl:   •  RA MULTIHEALTH FIBER 58.6 % powder, , Disp: , Rfl:   •  raNITIdine (ZANTAC) 150 MG tablet, , Disp: , Rfl:     Allergies  Allergies   Allergen Reactions   • Metformin GI Intolerance       Social History  Social History     Socioeconomic History   • Marital status: Single     Spouse name: Not on file   • Number of children: Not on file   • Years of  "education: Not on file   • Highest education level: Not on file   Tobacco Use   • Smoking status: Never Smoker   • Smokeless tobacco: Never Used   Substance and Sexual Activity   • Alcohol use: Yes     Comment: OCCASSION   • Drug use: No   • Sexual activity: Defer       Family History  Family History   Problem Relation Age of Onset   • Stroke Father    • Hypertension Father    • Hyperlipidemia Father    • Cancer Father    • Hypertension Mother    • Hyperlipidemia Mother    • Cancer Maternal Uncle    • Heart attack Paternal Uncle    • Hyperlipidemia Paternal Uncle    • Hypertension Paternal Uncle    • Cancer Paternal Uncle        Review of Systems  Constitutional: No fevers or chills  Skin: Negative for rash  Endocrine: No heat/cold intolerance   Cardiovascular: Negative for chest pain or dyspnea on exertion  Respiratory: Negative for shortness of breath or wheezing  Gastrointestinal: No constipation, nausea or vomiting  Genitourinary: No gross hematuria today  Musculoskeletal: Negative for low back pain  Neurological:  Negative for frequent headaches or dizziness  Lymph/Heme: Negative for leg swelling or calf pain.    Physical Exam  Visit Vitals  /90   Pulse 102   Resp 19   Ht 180.3 cm (70.98\")   Wt (!) 166 kg (365 lb 15.4 oz)   SpO2 98%   BMI 51.06 kg/m²     Constitutional: NAD, WDWN.   HEENT: NCAT. Conjunctivae normal.  MMM.  Endocrine: no clear thyromegaly    Cardiovascular: Regular rate.  Pulmonary/Chest: Respirations are even and non-labored bilaterally.  Back:  no CVA tenderness.  Neurological: A + O x 3.  Cranial Nerves II-XII grossly intact.  Extremities: MAGGIE x 4, Warm. No clubbing.  No cyanosis.    Skin: Pink, warm and dry.  No rashes noted.    Labs  Lab Results   Component Value Date    GLUCOSE 169 (H) 10/23/2019    BUN 15 10/23/2019    CREATININE 0.96 10/23/2019    EGFRIFNONA 95 10/23/2019    BCR 15.6 10/23/2019    K 3.7 10/23/2019    CO2 24.5 10/23/2019    CALCIUM 9.9 10/23/2019    ALBUMIN 4.40 " 10/23/2019    LABIL2 1.3 11/10/2015    AST 39 10/23/2019    ALT 69 (H) 10/23/2019       Lab Results   Component Value Date    WBC 13.00 (H) 10/23/2019    HGB 16.4 10/23/2019    HCT 49.1 10/23/2019    MCV 90.9 10/23/2019     10/23/2019       No results found for: LDH, URICACID    Lab Results   Component Value Date    CALCIUM 9.9 10/23/2019       Brief Urine Lab Results  (Last result in the past 365 days)      Color   Clarity   Blood   Leuk Est   Nitrite   Protein   CREAT   Urine HCG        10/29/19 0850 Yellow Clear Negative Negative Negative Negative               )No components found for: STONEANALYSI      Radiologic Studies  Ct Abdomen Pelvis Without Contrast    Result Date: 10/23/2019  Impression: 3 mm stone right renal pelvis without hydronephrosis  Fatty enlarged liver Authenticated by Evangelina Varela MD on 10/23/2019 08:48:10 PM    Xr Abdomen Kub    Result Date: 10/29/2019  Impression: Findings consistent with a 3 mm right renal stone.    This report was finalized on 10/29/2019 8:15 AM by J Luis Monsivais M.D..      I have personally reviewed these labs and images.      Assessment  Mr. Cheung is a 24 y.o. male with 3 mm right renal pelvis stone.      Plan  1. FU in 1 mo w/ repeat CT

## 2019-11-04 ENCOUNTER — APPOINTMENT (OUTPATIENT)
Dept: GENERAL RADIOLOGY | Facility: HOSPITAL | Age: 24
End: 2019-11-04

## 2019-11-04 ENCOUNTER — HOSPITAL ENCOUNTER (EMERGENCY)
Facility: HOSPITAL | Age: 24
Discharge: HOME | End: 2019-11-04
Attending: STUDENT IN AN ORGANIZED HEALTH CARE EDUCATION/TRAINING PROGRAM | Admitting: STUDENT IN AN ORGANIZED HEALTH CARE EDUCATION/TRAINING PROGRAM

## 2019-11-04 VITALS
BODY MASS INDEX: 44.1 KG/M2 | OXYGEN SATURATION: 96 % | HEART RATE: 86 BPM | HEIGHT: 71 IN | DIASTOLIC BLOOD PRESSURE: 99 MMHG | RESPIRATION RATE: 16 BRPM | TEMPERATURE: 98.5 F | SYSTOLIC BLOOD PRESSURE: 169 MMHG | WEIGHT: 315 LBS

## 2019-11-04 DIAGNOSIS — M79.672 LEFT FOOT PAIN: Primary | ICD-10-CM

## 2019-11-04 DIAGNOSIS — I10 HYPERTENSION, UNSPECIFIED TYPE: ICD-10-CM

## 2019-11-04 DIAGNOSIS — Q78.8 OSTEOPOIKILOSIS: ICD-10-CM

## 2019-11-04 PROCEDURE — 73630 X-RAY EXAM OF FOOT: CPT

## 2019-11-04 PROCEDURE — 99284 EMERGENCY DEPT VISIT MOD MDM: CPT

## 2019-11-04 RX ORDER — HYDROCODONE BITARTRATE AND ACETAMINOPHEN 5; 325 MG/1; MG/1
1 TABLET ORAL EVERY 6 HOURS PRN
Qty: 10 TABLET | Refills: 0 | Status: SHIPPED | OUTPATIENT
Start: 2019-11-04 | End: 2019-12-12

## 2019-11-04 RX ORDER — HYDROCODONE BITARTRATE AND ACETAMINOPHEN 5; 325 MG/1; MG/1
2 TABLET ORAL ONCE
Status: COMPLETED | OUTPATIENT
Start: 2019-11-04 | End: 2019-11-04

## 2019-11-04 RX ORDER — CLONIDINE HYDROCHLORIDE 0.2 MG/1
0.2 TABLET ORAL ONCE
Status: COMPLETED | OUTPATIENT
Start: 2019-11-04 | End: 2019-11-04

## 2019-11-04 RX ADMIN — HYDROCODONE BITARTRATE AND ACETAMINOPHEN 2 TABLET: 5; 325 TABLET ORAL at 06:07

## 2019-11-04 RX ADMIN — CLONIDINE HYDROCHLORIDE 0.2 MG: 0.2 TABLET ORAL at 06:41

## 2019-11-04 NOTE — ED PROVIDER NOTES
"Subjective   24-year-old male who presents to the emergency department complaining of left foot pain.  Patient states that he was following with Dr. Alejo and was diagnosed with osteopoikilosis.  States pain is severe and worse with ambulation.  Constant ache.  Patient states he would like to find out today exactly why he has this disorder.            Review of Systems   All other systems reviewed and are negative.      Past Medical History:   Diagnosis Date   • Anxiety    • AVM (arteriovenous malformation)    • Constipation    • Depression    • Diabetes mellitus (CMS/HCC)    • Elevated cholesterol    • Headache     REPORTS RELATED TO HTN   • History of coronary angiogram     REPORTS DONE AROUND 2016 AND 2017 AND THAT HE WAS DIAGNOSED WITH AVM   • Hypertension    • Kidney infection    • Pericarditis     REPORTS HE HAS A MILD CASE AROUND 2016 OR 2017   • Rectal bleeding    • Sleep apnea     REPORTS DIAGNOSED AROUND THE AGE OF 6 AND THAT HE WAS RETESTED AROUND THE AGE OF 14 AND WAS TOLD THAT HE \"OUTGREW IT\"   • Tattoos    • Wears glasses     RX GLASSES       Allergies   Allergen Reactions   • Metformin GI Intolerance       Past Surgical History:   Procedure Laterality Date   • COLONOSCOPY N/A 12/4/2018    Procedure: COLONOSCOPY WITH COLD BIOPSY;  Surgeon: Denny Bellamy MD;  Location: [facility] ENDOSCOPY;  Service: Gastroenterology   • EAR TUBES     • EYE MUSCLE SURGERY Bilateral    • TONSILLECTOMY AND ADENOIDECTOMY         Family History   Problem Relation Age of Onset   • Stroke Father    • Hypertension Father    • Hyperlipidemia Father    • Cancer Father    • Hypertension Mother    • Hyperlipidemia Mother    • Cancer Maternal Uncle    • Heart attack Paternal Uncle    • Hyperlipidemia Paternal Uncle    • Hypertension Paternal Uncle    • Cancer Paternal Uncle        Social History     Socioeconomic History   • Marital status: Single     Spouse name: Not on file   • Number of children: Not on file   • Years of " education: Not on file   • Highest education level: Not on file   Tobacco Use   • Smoking status: Never Smoker   • Smokeless tobacco: Never Used   Substance and Sexual Activity   • Alcohol use: Yes     Comment: OCCASSION   • Drug use: No   • Sexual activity: Defer           Objective   Physical Exam   Nursing note and vitals reviewed.      GEN: No acute distress, morbidly obese  Head: Normocephalic, atraumatic  Eyes: Pupils equal round reactive to light  ENT: Posterior pharynx normal in appearance, oral mucosa is moist  Chest: Nontender to palpation  Cardiovascular: Regular rate  Lungs: Clear to auscultation bilaterally  Abdomen: Soft, nontender, nondistended, no peritoneal signs  Extremities: Left foot is tender to palpation along the dorsal area with no obvious deformity or crepitus.  Neuro: GCS 15  Psych: Mood and affect are appropriate      Procedures           ED Course                  MDM  Number of Diagnoses or Management Options  Hypertension, unspecified type:   Left foot pain:   Osteopoikilosis:   Diagnosis management comments: No acute fracture.  Chronic findings consistent with previous x-rays.  Patient will need to establish care with another foot surgeon given that Dr. mcgowan is no longer here.  Will refer to Dr. Quinones's office.       Amount and/or Complexity of Data Reviewed  Tests in the radiology section of CPT®: reviewed  Decide to obtain previous medical records or to obtain history from someone other than the patient: yes  Review and summarize past medical records: yes  Independent visualization of images, tracings, or specimens: yes        Final diagnoses:   Left foot pain   Osteopoikilosis   Hypertension, unspecified type              Familia Warren MD  11/04/19 0718

## 2019-11-21 ENCOUNTER — HOSPITAL ENCOUNTER (OUTPATIENT)
Dept: CT IMAGING | Facility: HOSPITAL | Age: 24
Discharge: HOME | End: 2019-11-21
Admitting: UROLOGY

## 2019-11-21 DIAGNOSIS — N20.0 KIDNEY STONE: ICD-10-CM

## 2019-11-21 PROCEDURE — 74176 CT ABD & PELVIS W/O CONTRAST: CPT

## 2019-12-02 ENCOUNTER — OFFICE VISIT (OUTPATIENT)
Dept: UROLOGY | Facility: CLINIC | Age: 24
End: 2019-12-02

## 2019-12-02 VITALS — BODY MASS INDEX: 44.1 KG/M2 | WEIGHT: 315 LBS | HEIGHT: 71 IN | RESPIRATION RATE: 18 BRPM | TEMPERATURE: 97.6 F

## 2019-12-02 DIAGNOSIS — E66.01 MORBIDLY OBESE (HCC): ICD-10-CM

## 2019-12-02 DIAGNOSIS — N20.0 NEPHROLITHIASIS: Primary | ICD-10-CM

## 2019-12-02 PROCEDURE — 99213 OFFICE O/P EST LOW 20 MIN: CPT | Performed by: UROLOGY

## 2019-12-02 NOTE — PROGRESS NOTES
"Chief Complaint  Kidney Stone    HPI  Mr. Cheung is a 24 y.o. male who presents for further management of nephrolithiasis.    He presented to ER and was diagnosed with a 3mm R renal  stone. He presents today for follow up.  He has not had any recent flank pain or gross hematuria.  He is not having any lower urinary tract symptoms or fevers.    Stone related history:  Family history of kidney stones  no  Renal disease or anatomic abnormality: no  Malabsorptive disease or gastric bypass: no  Frequent UTI's    no  Parathyroid disease    no   His BMI is 51    Dietary Considerations  Soda -0 per day  Fast food -5 per week, daily  Water -\"a gallon\"  per day  Yes, adds salt to foods    Past Medical History  Past Medical History:   Diagnosis Date   • Anxiety    • AVM (arteriovenous malformation)    • Constipation    • Depression    • Diabetes mellitus (CMS/HCC)    • Elevated cholesterol    • Headache     REPORTS RELATED TO HTN   • History of coronary angiogram     REPORTS DONE AROUND 2016 AND 2017 AND THAT HE WAS DIAGNOSED WITH AVM   • Hypertension    • Kidney infection    • Pericarditis     REPORTS HE HAS A MILD CASE AROUND 2016 OR 2017   • Rectal bleeding    • Sleep apnea     REPORTS DIAGNOSED AROUND THE AGE OF 6 AND THAT HE WAS RETESTED AROUND THE AGE OF 14 AND WAS TOLD THAT HE \"OUTGREW IT\"   • Tattoos    • Wears glasses     RX GLASSES       Past Surgical History  Past Surgical History:   Procedure Laterality Date   • COLONOSCOPY N/A 12/4/2018    Procedure: COLONOSCOPY WITH COLD BIOPSY;  Surgeon: Denny Bellamy MD;  Location: [facility] ENDOSCOPY;  Service: Gastroenterology   • EAR TUBES     • EYE MUSCLE SURGERY Bilateral    • TONSILLECTOMY AND ADENOIDECTOMY         Medications    Current Outpatient Medications:   •  amLODIPine (NORVASC) 10 MG tablet, Take 10 mg by mouth Daily., Disp: , Rfl:   •  CloNIDine (CATAPRES) 0.1 MG tablet, Take 1 tablet by mouth Every 6 (Six) Hours. PRN for SBP>150 mm Hg and/or DBP> 100 mm Hg " Take 2 tablets PO as needed for SBP> 200 mm Hg (Patient taking differently: Take 0.1 mg by mouth Take As Directed. PRN for SBP>150 mm Hg and/or DBP> 100 mm Hg Take 2 tablets PO as needed for SBP> 200 mm Hg), Disp: 30 tablet, Rfl: 11  •  FLUoxetine (PROzac) 20 MG capsule, Take 60 mg by mouth Daily., Disp: , Rfl:   •  glipiZIDE (GLUCOTROL XL) 5 MG ER tablet, Take 5 mg by mouth Daily., Disp: , Rfl:   •  HYDROcodone-acetaminophen (NORCO) 5-325 MG per tablet, Take 1 tablet by mouth Every 6 (Six) Hours As Needed for Severe Pain ., Disp: 10 tablet, Rfl: 0  •  lisinopril-hydrochlorothiazide (PRINZIDE,ZESTORETIC) 20-25 MG per tablet, Take 2 tablets by mouth Daily., Disp: , Rfl:   •  meloxicam (MOBIC) 7.5 MG tablet, Take 1 tablet by mouth Daily., Disp: 30 tablet, Rfl: 0  •  methylcellulose oral powder, Take 2 g by mouth 3 (Three) Times a Day., Disp: , Rfl:   •  nitrofurantoin, macrocrystal-monohydrate, (MACROBID) 100 MG capsule, Take 100 mg by mouth 2 (Two) Times a Day., Disp: , Rfl:   •  ondansetron ODT (ZOFRAN-ODT) 4 MG disintegrating tablet, Take 1 tablet by mouth Every 6 (Six) Hours As Needed for Nausea or Vomiting., Disp: 10 tablet, Rfl: 0  •  ONETOUCH DELICA LANCETS 33G misc, , Disp: , Rfl:   •  polyethylene glycol (MIRALAX) packet, Take 17 g by mouth Daily As Needed (constipation). (Patient taking differently: Take 17 g by mouth Take As Directed. PER COLON PREP INSTRUCTIONS), Disp: 119 g, Rfl: 0  •  pravastatin (PRAVACHOL) 40 MG tablet, Take 40 mg by mouth Daily., Disp: , Rfl:   •  RA MULTIHEALTH FIBER 58.6 % powder, , Disp: , Rfl:   •  raNITIdine (ZANTAC) 150 MG tablet, , Disp: , Rfl:     Allergies  Allergies   Allergen Reactions   • Metformin GI Intolerance       Social History  Social History     Socioeconomic History   • Marital status: Single     Spouse name: Not on file   • Number of children: Not on file   • Years of education: Not on file   • Highest education level: Not on file   Tobacco Use   • Smoking  "status: Never Smoker   • Smokeless tobacco: Never Used   Substance and Sexual Activity   • Alcohol use: Yes     Comment: OCCASSION   • Drug use: No   • Sexual activity: Defer       Family History  Family History   Problem Relation Age of Onset   • Stroke Father    • Hypertension Father    • Hyperlipidemia Father    • Cancer Father    • Hypertension Mother    • Hyperlipidemia Mother    • Cancer Maternal Uncle    • Heart attack Paternal Uncle    • Hyperlipidemia Paternal Uncle    • Hypertension Paternal Uncle    • Cancer Paternal Uncle        Review of Systems  Constitutional: No fevers or chills  Skin: Negative for rash  Endocrine: No heat/cold intolerance   Cardiovascular: Negative for chest pain or dyspnea on exertion  Respiratory: Negative for shortness of breath or wheezing  Gastrointestinal: No constipation, nausea or vomiting  Genitourinary: No gross hematuria today  Musculoskeletal: Negative for low back pain  Neurological:  Negative for frequent headaches or dizziness  Lymph/Heme: Negative for leg swelling or calf pain.    Physical Exam  Visit Vitals  Temp 97.6 °F (36.4 °C)   Resp 18   Ht 180.3 cm (70.98\")   Wt (!) 163 kg (360 lb)   BMI 50.23 kg/m²     Constitutional: NAD, WDWN.   HEENT: NCAT. Conjunctivae normal.  MMM.  Endocrine: no clear thyromegaly    Cardiovascular: Regular rate.  Pulmonary/Chest: Respirations are even and non-labored bilaterally.  Back:  no CVA tenderness.  Neurological: A + O x 3.  Cranial Nerves II-XII grossly intact.  Extremities: MAGGIE x 4, Warm. No clubbing.  No cyanosis.    Skin: Pink, warm and dry.  No rashes noted.    Labs  Lab Results   Component Value Date    GLUCOSE 169 (H) 10/23/2019    BUN 15 10/23/2019    CREATININE 0.96 10/23/2019    EGFRIFNONA 95 10/23/2019    BCR 15.6 10/23/2019    K 3.7 10/23/2019    CO2 24.5 10/23/2019    CALCIUM 9.9 10/23/2019    ALBUMIN 4.40 10/23/2019    LABIL2 1.3 11/10/2015    AST 39 10/23/2019    ALT 69 (H) 10/23/2019       Lab Results   Component " Value Date    WBC 13.00 (H) 10/23/2019    HGB 16.4 10/23/2019    HCT 49.1 10/23/2019    MCV 90.9 10/23/2019     10/23/2019       No results found for: LDH, URICACID    Lab Results   Component Value Date    CALCIUM 9.9 10/23/2019       Brief Urine Lab Results  (Last result in the past 365 days)      Color   Clarity   Blood   Leuk Est   Nitrite   Protein   CREAT   Urine HCG        10/29/19 0850 Yellow Clear Negative Negative Negative Negative               )No components found for: STONEANALYSI      Radiologic Studies  Xr Foot 3+ View Left    Result Date: 11/4/2019  Impression: 1. No acute bony abnormality or significant change. Consider MRI if symptoms persist. 2. Findings are again most consistent with osteopoikilosis.  This report was finalized on 11/4/2019 9:25 AM by Thang Guerrero MD.    Ct Abdomen Pelvis Stone Protocol    Result Date: 11/21/2019  Impression: 1. Nonobstructing right renal stone.  2. Hepatic fatty infiltration.  3. Numerous mesenteric lymph nodes more numerous than often visualized, similar to previous and favored to be reactive. Other etiologies are thought unlikely but a repeat scan within 6 months would be useful to ensure stability.   This study was performed with techniques to keep radiation doses as low as reasonably achievable (ALARA). Individualized dose reduction techniques using automated exposure control or adjustment of mA and/or kV according to the patient size were employed.  This report was finalized on 11/21/2019 10:41 AM by Thang Guerrero MD.      I have personally reviewed these labs and images.      Assessment  Mr. Cheung is a 24 y.o. male with 4 mm right lower pole renal stone on repeat CT imaging.  He is asymptomatic today.  We discussed his options in favor observation.      Plan  1. FU as needed  2.  Continue high fluid intake and reduce fast food and salt intake.  Weight loss recommended.

## 2019-12-12 ENCOUNTER — RESULTS ENCOUNTER (OUTPATIENT)
Dept: BARIATRICS/WEIGHT MGMT | Facility: CLINIC | Age: 24
End: 2019-12-12

## 2019-12-12 ENCOUNTER — OFFICE VISIT (OUTPATIENT)
Dept: BARIATRICS/WEIGHT MGMT | Facility: CLINIC | Age: 24
End: 2019-12-12

## 2019-12-12 ENCOUNTER — DOCUMENTATION (OUTPATIENT)
Dept: BARIATRICS/WEIGHT MGMT | Facility: CLINIC | Age: 24
End: 2019-12-12

## 2019-12-12 VITALS
DIASTOLIC BLOOD PRESSURE: 90 MMHG | HEART RATE: 72 BPM | OXYGEN SATURATION: 100 % | SYSTOLIC BLOOD PRESSURE: 148 MMHG | WEIGHT: 315 LBS | HEIGHT: 69 IN | RESPIRATION RATE: 18 BRPM | BODY MASS INDEX: 46.65 KG/M2 | TEMPERATURE: 96.5 F

## 2019-12-12 DIAGNOSIS — R10.13 DYSPEPSIA: ICD-10-CM

## 2019-12-12 DIAGNOSIS — I10 ESSENTIAL HYPERTENSION: ICD-10-CM

## 2019-12-12 DIAGNOSIS — G47.33 OBSTRUCTIVE SLEEP APNEA SYNDROME: ICD-10-CM

## 2019-12-12 DIAGNOSIS — E78.5 HYPERLIPIDEMIA, UNSPECIFIED HYPERLIPIDEMIA TYPE: ICD-10-CM

## 2019-12-12 DIAGNOSIS — E66.9 DIABETES MELLITUS TYPE 2 IN OBESE (HCC): ICD-10-CM

## 2019-12-12 DIAGNOSIS — K21.9 GASTROESOPHAGEAL REFLUX DISEASE, ESOPHAGITIS PRESENCE NOT SPECIFIED: Primary | ICD-10-CM

## 2019-12-12 DIAGNOSIS — R06.09 DYSPNEA ON EXERTION: ICD-10-CM

## 2019-12-12 DIAGNOSIS — M54.9 BACK PAIN, UNSPECIFIED BACK LOCATION, UNSPECIFIED BACK PAIN LATERALITY, UNSPECIFIED CHRONICITY: ICD-10-CM

## 2019-12-12 DIAGNOSIS — M19.90 OSTEOARTHRITIS, UNSPECIFIED OSTEOARTHRITIS TYPE, UNSPECIFIED SITE: ICD-10-CM

## 2019-12-12 DIAGNOSIS — E11.69 DIABETES MELLITUS TYPE 2 IN OBESE (HCC): ICD-10-CM

## 2019-12-12 DIAGNOSIS — R53.83 FATIGUE, UNSPECIFIED TYPE: ICD-10-CM

## 2019-12-12 PROCEDURE — 99215 OFFICE O/P EST HI 40 MIN: CPT | Performed by: SURGERY

## 2019-12-12 RX ORDER — SODIUM CHLORIDE 9 MG/ML
150 INJECTION, SOLUTION INTRAVENOUS CONTINUOUS
Status: CANCELLED | OUTPATIENT
Start: 2019-12-12

## 2019-12-12 NOTE — PROGRESS NOTES
"Weight Loss Surgery  Presurgical Nutrition Assessment     Ken Cheung  12/12/2019  [id number]  [id number]  1995  male    Surgery desired: Sleeve Gastrectomy    Ht 174 cm (68.5\"); Wt 165 kg (363 #); BMI 54.4  Past Medical History:   Diagnosis Date   • Anxiety    • AVM (arteriovenous malformation)     brain.  found after workup for severe headache during hypertension. Advised to control BP   • Back pain    • Constipation    • Depression    • DM2 (diabetes mellitus, type 2) (CMS/HCC)     glipizide.  A1C 9 per patient.   • Fatigue    • Fatty liver     transaminitis   • GERD (gastroesophageal reflux disease)    • Headache     REPORTS RELATED TO HTN, constant.  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       Past Surgical History:   Procedure Laterality Date   • COLONOSCOPY N/A 12/4/2018    Procedure: COLONOSCOPY WITH COLD BIOPSY;  Surgeon: Denny Bellamy MD;  Location: [facility] ENDOSCOPY;  Service: Gastroenterology   • EAR TUBES     • EYE MUSCLE SURGERY Bilateral    • TONSILLECTOMY AND ADENOIDECTOMY       Allergies   Allergen Reactions   • Metformin GI Intolerance       Current Outpatient Medications:   •  amLODIPine (NORVASC) 10 MG tablet, Take 10 mg by mouth Daily., Disp: , Rfl:   •  CloNIDine (CATAPRES) 0.1 MG tablet, Take 1 tablet by mouth Every 6 (Six) Hours. PRN for SBP>150 mm Hg and/or DBP> 100 mm Hg Take 2 tablets PO as needed for SBP> 200 mm Hg (Patient taking differently: Take 0.1 mg by mouth Take As Directed. PRN for SBP>150 mm Hg and/or DBP> 100 mm Hg Take 2 tablets PO as needed for SBP> 200 mm Hg), Disp: 30 tablet, Rfl: 11  •  FLUoxetine " (PROzac) 20 MG capsule, Take 60 mg by mouth Daily., Disp: , Rfl:   •  glipiZIDE (GLUCOTROL XL) 5 MG ER tablet, Take 5 mg by mouth Daily., Disp: , Rfl:   •  lisinopril-hydrochlorothiazide (PRINZIDE,ZESTORETIC) 20-25 MG per tablet, Take 2 tablets by mouth Daily., Disp: , Rfl:   •  meloxicam (MOBIC) 7.5 MG tablet, Take 1 tablet by mouth Daily., Disp: 30 tablet, Rfl: 0  •  ONETOUCH DELICA LANCETS 33G misc, , Disp: , Rfl:   •  pravastatin (PRAVACHOL) 40 MG tablet, Take 40 mg by mouth Daily., Disp: , Rfl:   •  raNITIdine (ZANTAC) 150 MG tablet, , Disp: , Rfl:       Nutrition Assessment    Estimated energy needs:  2610 kcal    Estimated calories for weight loss:  1700 kcal    IBW (Pounds):  165 #        Excess body weight (Pounds):  200 #       Nutrition Recall  24 Hour recall: (B) (L) (D) -  Reviewed and discussed with patient.  Brkfast @ 9am = 2 sm sausage patties, 3 sl william & 3 eggs.  Lunch @ 1pm = 3 oz chicken /c sesame in rice.  Dinner = 9 oz ribeye /c mustard greens.  Drinks 1 diet Dr Pepper qd, down from 4 liters DP 1 yr ago.    Affirmed pt. Ingests adequate protein for wt mgt. He will work to distribute protein in 3 meals + 2-3 snks throughout day.       Exercise  never      Education    Provided information packet re:  Sleeve Gastrectomy  1. Reviewed guidelines for higher protein, limited carbohydrate diet to promote weight loss.  Encouraged patient to incorporate these principles of healthy eating from now until approximately 2 weeks prior to bariatric surgery date, when an even lower carbohydrate “liver-shrinking” regimen will be followed. (Information sheet re pre-op diet given).  Explained that after recovery from surgery this diet will again be followed to ensure further loss and for weight maintenance.    2. Encouraged patient to choose an acceptable protein supplement powder or shake for post-surgery liquid diet.  Provided product guidelines and examples.    3. Explained importance of goal setting to help  in changing eating behaviors that are not conducive to weight loss.  Targeted several on a worksheet which also included spaces for patient to work on issues specific to them.  4. Provided follow-up options for support, including contact information for dietitians here, if desired.  Web-based support information and apps for smart phones and computers given.  Noted that monthly support group is offered at this clinic, and that support is associated with successful weight loss.    Recommend that team proceed with surgery and follow per protocol.      Nutrition Goals   Dietary Guidelines per information packet as described above  Protein goal:  grams per day   Carbohydrate goal:  100-140 grams per day  Eliminate soda, sweet tea, etc.     Exercise Goals  Continue current exercise routine   Add 15-30 minutes of activity per day as tolerated      Lisa Cuellar, DAWNA  12/12/2019  4:56 PM

## 2019-12-12 NOTE — PROGRESS NOTES
[facility] BARIATRIC SURGERY  [address]  [facility] [address]  [phone]      Patient  Name:  Ken Cheung  :  1995      Date of Visit: 2019      Chief Complaint:  weight gain; unable to maintain weight loss    History of Present Illness:  Ken Cheung is a 24 y.o. male who presents today for evaluation, education and consultation regarding weight loss surgery. The patient is interested in sleeve gastrectomy.     Ken has been overweight for at least 13 years, has been 35 pounds or more overweight for at least 13 years, has been 100 pounds or more overweight for 10 or more years and started dieting at age 22.  The patient describes their eating habits as volume eater, high carbs, pop, eating out/fast food.  At one point drank 4L of Dr. Pepper per day!    Previous diet attempts include: low carb among others.  The most weight Ken lost was 40 pounds on low carb but was only able to maintain that weight loss for a short time.  His maximum lifetime weight is 385 pounds.    As above, patient has been overweight for many years, with numerous failed dietary/weight loss attempts.  She now has obesity related comorbidities and as such has decided to pursue weight loss surgery.    All past medical, surgical, social and family history have been obtained and discussed as pertinent to bariatric surgery as below.     No hx of DVT/PE.    +GERD, wakes up from sleep strangling.  PRN ranitidine.  Denies postprandial abd pain/nausea with greasy foods.  No kidney/liver failure.      Past Medical History:   Diagnosis Date   • Anxiety    • AVM (arteriovenous malformation)     brain.  found after workup for severe headache during hypertension. Advised to control BP   • Back pain    • Constipation    • Depression    • DM2 (diabetes mellitus, type 2) (CMS/HCC)     glipizide.  A1C 9 per patient.   • Fatigue    • Fatty liver     transaminitis   • GERD  (gastroesophageal reflux disease)    • Headache     REPORTS RELATED TO HTN, constant.  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       Past Surgical History:   Procedure Laterality Date   • COLONOSCOPY N/A 12/4/2018    Procedure: COLONOSCOPY WITH COLD BIOPSY;  Surgeon: Denny Bellamy MD;  Location: [facility] ENDOSCOPY;  Service: Gastroenterology   • EAR TUBES     • EYE MUSCLE SURGERY Bilateral    • TONSILLECTOMY AND ADENOIDECTOMY         Allergies   Allergen Reactions   • Metformin GI Intolerance       Current Outpatient Medications:   •  amLODIPine (NORVASC) 10 MG tablet, Take 10 mg by mouth Daily., Disp: , Rfl:   •  CloNIDine (CATAPRES) 0.1 MG tablet, Take 1 tablet by mouth Every 6 (Six) Hours. PRN for SBP>150 mm Hg and/or DBP> 100 mm Hg Take 2 tablets PO as needed for SBP> 200 mm Hg (Patient taking differently: Take 0.1 mg by mouth Take As Directed. PRN for SBP>150 mm Hg and/or DBP> 100 mm Hg Take 2 tablets PO as needed for SBP> 200 mm Hg), Disp: 30 tablet, Rfl: 11  •  FLUoxetine (PROzac) 20 MG capsule, Take 60 mg by mouth Daily., Disp: , Rfl:   •  glipiZIDE (GLUCOTROL XL) 5 MG ER tablet, Take 5 mg by mouth Daily., Disp: , Rfl:   •  lisinopril-hydrochlorothiazide (PRINZIDE,ZESTORETIC) 20-25 MG per tablet, Take 2 tablets by mouth Daily., Disp: , Rfl:   •  meloxicam (MOBIC) 7.5 MG tablet, Take 1 tablet by mouth Daily., Disp: 30 tablet, Rfl: 0  •  ONETOUCH DELICA LANCETS 33G misc, , Disp: , Rfl:   •  pravastatin (PRAVACHOL) 40 MG tablet, Take 40 mg by mouth Daily., Disp: , Rfl:   •  raNITIdine (ZANTAC) 150 MG tablet, , Disp: , Rfl:     Social History     Socioeconomic  History   • Marital status: Single     Spouse name: Not on file   • Number of children: Not on file   • Years of education: Not on file   • Highest education level: Not on file   Occupational History   • Occupation:      Employer: [employer]   Tobacco Use   • Smoking status: Never Smoker   • Smokeless tobacco: Never Used   • Tobacco comment: Is not around secondhand smoke in his house, but at family's house   Substance and Sexual Activity   • Alcohol use: Yes     Frequency: Monthly or less     Drinks per session: 1 or 2     Comment: OCCASION   • Drug use: No   • Sexual activity: Defer   Social History Narrative    Lives alone     Family History   Problem Relation Age of Onset   • Stroke Father    • Hypertension Father    • Hyperlipidemia Father    • Cancer Father    • Hypertension Mother    • Hyperlipidemia Mother    • Cancer Maternal Uncle    • Heart attack Paternal Uncle    • Hyperlipidemia Paternal Uncle    • Hypertension Paternal Uncle    • Cancer Paternal Uncle        Review of Systems   Constitutional: Positive for fatigue and unexpected weight gain. Negative for chills, diaphoresis, fever and unexpected weight loss.   HENT: Negative for congestion and facial swelling.    Eyes: Negative for blurred vision, double vision and discharge.   Respiratory: Negative for chest tightness, shortness of breath and stridor.    Cardiovascular: Negative for chest pain, palpitations and leg swelling.   Gastrointestinal: Negative for blood in stool.   Endocrine: Negative for polydipsia.   Genitourinary: Negative for hematuria.   Musculoskeletal: Positive for arthralgias.   Skin: Negative for color change.   Allergic/Immunologic: Negative for immunocompromised state.   Neurological: Negative for confusion.   Psychiatric/Behavioral: Negative for self-injury.        Physical Exam:  Vital Signs:  Weight: (!) 165 kg (363 lb)   Body mass index is 54.39 kg/m².  Temp: 96.5 °F (35.8 °C)   Heart Rate: 72   BP: 148/90      Physical Exam   Constitutional: He is oriented to person, place, and time. He appears well-developed and well-nourished.   HENT:   Head: Normocephalic and atraumatic.   Nose: Nose normal.   Eyes: Pupils are equal, round, and reactive to light. Conjunctivae and EOM are normal.   Neck: Normal range of motion. Neck supple. Carotid bruit is not present. No tracheal deviation present. No thyromegaly present.   Cardiovascular: Normal rate, regular rhythm and normal heart sounds.   Pulmonary/Chest: Effort normal and breath sounds normal. No respiratory distress.   Abdominal: Soft. He exhibits no distension. There is no hepatosplenomegaly. There is no tenderness.   No surgical scars.  Dimple about 10 cm inferior to umbilicus, pt denies trauma or surgery.  No palpable hernias   Musculoskeletal: Normal range of motion. He exhibits no edema or deformity.   Neurological: He is alert and oriented to person, place, and time. No cranial nerve deficit. Coordination normal.   Skin: Skin is warm and dry. No rash noted.   tattoos   Psychiatric: He has a normal mood and affect. His behavior is normal. Judgment and thought content normal.   Vitals reviewed.      Patient Active Problem List   Diagnosis   • Morbidly obese (CMS/HCC)   • Essential hypertension   • AVM (arteriovenous malformation) brain   • Rectal bleed   • Neutrophilic leukocytosis       Assessment:    Ken Cheung is a 24 y.o. year old male with medically complicated obesity pursuing sleeve gastrectomy.    Weight loss surgery is deemed medically necessary given the following obesity related comorbidities including sleep apnea, diabetes, hypertension, dyslipidemia, osteoarthritis, back pain, knee pain, GERD and depression with current Weight: (!) 165 kg (363 lb) and Body mass index is 54.39 kg/m²..    He is a good candidate for weight loss surgery pending further evaluation.    Plan:  The consultation plan and program requirements were reviewed with the  patient.  A psychological evaluation will be arranged.  A nutritional evaluation will be performed.  The patient was advised to start a high protein and low carbohydrate diet.  Necessary lifestyle modifications were discussed.  Instructions on how to access FRANCI was given to the patient.  FRANCI is an internet based educational video that explains the surgical procedure chosen and answers basic questions regarding that procedure.     Preoperative testing will include: CBC, CMP, Lipids, TSH, HgA1C, H.Pylori, Pulmonary Function Testing, CXR, EKG and EGD     Preop clearances required prior to surgery will include Cardiac.      Patient understands that bariatric surgery is not cosmetic surgery but rather a tool to help make a lifelong commitment to lifestyle changes including diet, exercise, behavior modifications, and healthy habits.  The patient has been educated on expected postoperative lifestyle changes, including commitment to high protein diet, vitamin regimen, and exercise program.  They are aware that support groups are encouraged for optimal weight loss results.        I spent 9 minutes discussion smoking cessation and/or avoidance of second-hand smoke.          Aileen Antonio MD

## 2019-12-13 LAB
ALBUMIN SERPL-MCNC: 4.3 G/DL (ref 3.5–5.5)
ALBUMIN/GLOB SERPL: 1.5 {RATIO} (ref 1.2–2.2)
ALP SERPL-CCNC: 108 IU/L (ref 39–117)
ALT SERPL-CCNC: 57 IU/L (ref 0–44)
AST SERPL-CCNC: 27 IU/L (ref 0–40)
BASOPHILS # BLD AUTO: 0.1 X10E3/UL (ref 0–0.2)
BASOPHILS NFR BLD AUTO: 1 %
BILIRUB SERPL-MCNC: 0.4 MG/DL (ref 0–1.2)
BUN SERPL-MCNC: 15 MG/DL (ref 6–20)
BUN/CREAT SERPL: 15 (ref 9–20)
CALCIUM SERPL-MCNC: 10.1 MG/DL (ref 8.7–10.2)
CHLORIDE SERPL-SCNC: 97 MMOL/L (ref 96–106)
CHOLEST SERPL-MCNC: 190 MG/DL (ref 100–199)
CO2 SERPL-SCNC: 24 MMOL/L (ref 20–29)
CREAT SERPL-MCNC: 1.03 MG/DL (ref 0.76–1.27)
EOSINOPHIL # BLD AUTO: 0.2 X10E3/UL (ref 0–0.4)
EOSINOPHIL NFR BLD AUTO: 2 %
ERYTHROCYTE [DISTWIDTH] IN BLOOD BY AUTOMATED COUNT: 12.7 % (ref 12.3–15.4)
GLOBULIN SER CALC-MCNC: 2.9 G/DL (ref 1.5–4.5)
GLUCOSE SERPL-MCNC: 146 MG/DL (ref 65–99)
HBA1C MFR BLD: 9 % (ref 4.8–5.6)
HCT VFR BLD AUTO: 45.8 % (ref 37.5–51)
HDLC SERPL-MCNC: 35 MG/DL
HGB BLD-MCNC: 16 G/DL (ref 13–17.7)
IMM GRANULOCYTES # BLD AUTO: 0 X10E3/UL (ref 0–0.1)
IMM GRANULOCYTES NFR BLD AUTO: 0 %
LDLC SERPL CALC-MCNC: 116 MG/DL (ref 0–99)
LYMPHOCYTES # BLD AUTO: 3.9 X10E3/UL (ref 0.7–3.1)
LYMPHOCYTES NFR BLD AUTO: 31 %
MCH RBC QN AUTO: 31.1 PG (ref 26.6–33)
MCHC RBC AUTO-ENTMCNC: 34.9 G/DL (ref 31.5–35.7)
MCV RBC AUTO: 89 FL (ref 79–97)
MONOCYTES # BLD AUTO: 0.9 X10E3/UL (ref 0.1–0.9)
MONOCYTES NFR BLD AUTO: 7 %
NEUTROPHILS # BLD AUTO: 7.3 X10E3/UL (ref 1.4–7)
NEUTROPHILS NFR BLD AUTO: 59 %
PLATELET # BLD AUTO: 371 X10E3/UL (ref 150–450)
POTASSIUM SERPL-SCNC: 5 MMOL/L (ref 3.5–5.2)
PROT SERPL-MCNC: 7.2 G/DL (ref 6–8.5)
RBC # BLD AUTO: 5.15 X10E6/UL (ref 4.14–5.8)
SODIUM SERPL-SCNC: 138 MMOL/L (ref 134–144)
TRIGL SERPL-MCNC: 197 MG/DL (ref 0–149)
TSH SERPL DL<=0.005 MIU/L-ACNC: 2.85 UIU/ML (ref 0.45–4.5)
VLDLC SERPL CALC-MCNC: 39 MG/DL (ref 5–40)
WBC # BLD AUTO: 12.4 X10E3/UL (ref 3.4–10.8)

## 2019-12-23 PROBLEM — K21.9 GASTROESOPHAGEAL REFLUX DISEASE: Status: ACTIVE | Noted: 2019-12-23

## 2020-08-10 ENCOUNTER — TRANSCRIBE ORDERS (OUTPATIENT)
Dept: ADMINISTRATIVE | Facility: HOSPITAL | Age: 25
End: 2020-08-10

## 2020-08-10 DIAGNOSIS — N20.0 NEPHROLITHIASIS: Primary | ICD-10-CM

## 2020-11-05 ENCOUNTER — APPOINTMENT (OUTPATIENT)
Dept: GENERAL RADIOLOGY | Facility: HOSPITAL | Age: 25
End: 2020-11-05

## 2020-11-05 ENCOUNTER — HOSPITAL ENCOUNTER (EMERGENCY)
Facility: HOSPITAL | Age: 25
Discharge: HOME | End: 2020-11-05
Attending: EMERGENCY MEDICINE | Admitting: EMERGENCY MEDICINE

## 2020-11-05 ENCOUNTER — APPOINTMENT (OUTPATIENT)
Dept: CT IMAGING | Facility: HOSPITAL | Age: 25
End: 2020-11-05

## 2020-11-05 VITALS
WEIGHT: 315 LBS | TEMPERATURE: 98.7 F | DIASTOLIC BLOOD PRESSURE: 95 MMHG | BODY MASS INDEX: 44.1 KG/M2 | RESPIRATION RATE: 18 BRPM | SYSTOLIC BLOOD PRESSURE: 168 MMHG | HEIGHT: 71 IN | HEART RATE: 101 BPM | OXYGEN SATURATION: 97 %

## 2020-11-05 DIAGNOSIS — R73.9 HYPERGLYCEMIA: ICD-10-CM

## 2020-11-05 DIAGNOSIS — R51.9 NONINTRACTABLE EPISODIC HEADACHE, UNSPECIFIED HEADACHE TYPE: ICD-10-CM

## 2020-11-05 DIAGNOSIS — I10 ELEVATED BLOOD PRESSURE READING WITH DIAGNOSIS OF HYPERTENSION: Primary | ICD-10-CM

## 2020-11-05 LAB
ALBUMIN SERPL-MCNC: 4.3 G/DL (ref 3.5–5.2)
ALBUMIN/GLOB SERPL: 1.3 G/DL
ALP SERPL-CCNC: 136 U/L (ref 39–117)
ALT SERPL W P-5'-P-CCNC: 93 U/L (ref 1–41)
ANION GAP SERPL CALCULATED.3IONS-SCNC: 12.9 MMOL/L (ref 5–15)
AST SERPL-CCNC: 40 U/L (ref 1–40)
BASOPHILS # BLD AUTO: 0.14 10*3/MM3 (ref 0–0.2)
BASOPHILS NFR BLD AUTO: 1.1 % (ref 0–1.5)
BILIRUB SERPL-MCNC: 0.4 MG/DL (ref 0–1.2)
BILIRUB UR QL STRIP: NEGATIVE
BUN SERPL-MCNC: 17 MG/DL (ref 6–20)
BUN/CREAT SERPL: 16.5 (ref 7–25)
CALCIUM SPEC-SCNC: 10.4 MG/DL (ref 8.6–10.5)
CHLORIDE SERPL-SCNC: 94 MMOL/L (ref 98–107)
CLARITY UR: CLEAR
CO2 SERPL-SCNC: 26.1 MMOL/L (ref 22–29)
COLOR UR: YELLOW
CREAT SERPL-MCNC: 1.03 MG/DL (ref 0.76–1.27)
DEPRECATED RDW RBC AUTO: 39.7 FL (ref 37–54)
EOSINOPHIL # BLD AUTO: 0.24 10*3/MM3 (ref 0–0.4)
EOSINOPHIL NFR BLD AUTO: 2 % (ref 0.3–6.2)
ERYTHROCYTE [DISTWIDTH] IN BLOOD BY AUTOMATED COUNT: 12.1 % (ref 12.3–15.4)
GFR SERPL CREATININE-BSD FRML MDRD: 87 ML/MIN/1.73
GLOBULIN UR ELPH-MCNC: 3.4 GM/DL
GLUCOSE SERPL-MCNC: 313 MG/DL (ref 65–99)
GLUCOSE UR STRIP-MCNC: ABNORMAL MG/DL
HCT VFR BLD AUTO: 48.7 % (ref 37.5–51)
HGB BLD-MCNC: 16.9 G/DL (ref 13–17.7)
HGB UR QL STRIP.AUTO: NEGATIVE
IMM GRANULOCYTES # BLD AUTO: 0.06 10*3/MM3 (ref 0–0.05)
IMM GRANULOCYTES NFR BLD AUTO: 0.5 % (ref 0–0.5)
KETONES UR QL STRIP: NEGATIVE
LEUKOCYTE ESTERASE UR QL STRIP.AUTO: NEGATIVE
LYMPHOCYTES # BLD AUTO: 3.37 10*3/MM3 (ref 0.7–3.1)
LYMPHOCYTES NFR BLD AUTO: 27.6 % (ref 19.6–45.3)
MCH RBC QN AUTO: 31.2 PG (ref 26.6–33)
MCHC RBC AUTO-ENTMCNC: 34.7 G/DL (ref 31.5–35.7)
MCV RBC AUTO: 89.9 FL (ref 79–97)
MONOCYTES # BLD AUTO: 0.92 10*3/MM3 (ref 0.1–0.9)
MONOCYTES NFR BLD AUTO: 7.5 % (ref 5–12)
NEUTROPHILS NFR BLD AUTO: 61.3 % (ref 42.7–76)
NEUTROPHILS NFR BLD AUTO: 7.49 10*3/MM3 (ref 1.7–7)
NITRITE UR QL STRIP: NEGATIVE
NRBC BLD AUTO-RTO: 0 /100 WBC (ref 0–0.2)
PH UR STRIP.AUTO: 6 [PH] (ref 5–8)
PLATELET # BLD AUTO: 370 10*3/MM3 (ref 140–450)
PMV BLD AUTO: 10.1 FL (ref 6–12)
POTASSIUM SERPL-SCNC: 4.1 MMOL/L (ref 3.5–5.2)
PROT SERPL-MCNC: 7.7 G/DL (ref 6–8.5)
PROT UR QL STRIP: NEGATIVE
RBC # BLD AUTO: 5.42 10*6/MM3 (ref 4.14–5.8)
SODIUM SERPL-SCNC: 133 MMOL/L (ref 136–145)
SP GR UR STRIP: 1.02 (ref 1–1.03)
TROPONIN T SERPL-MCNC: <0.01 NG/ML (ref 0–0.03)
TROPONIN T SERPL-MCNC: <0.01 NG/ML (ref 0–0.03)
UROBILINOGEN UR QL STRIP: ABNORMAL
WBC # BLD AUTO: 12.22 10*3/MM3 (ref 3.4–10.8)

## 2020-11-05 PROCEDURE — 80053 COMPREHEN METABOLIC PANEL: CPT | Performed by: PHYSICIAN ASSISTANT

## 2020-11-05 PROCEDURE — 70450 CT HEAD/BRAIN W/O DYE: CPT

## 2020-11-05 PROCEDURE — 99284 EMERGENCY DEPT VISIT MOD MDM: CPT

## 2020-11-05 PROCEDURE — 85025 COMPLETE CBC W/AUTO DIFF WBC: CPT | Performed by: PHYSICIAN ASSISTANT

## 2020-11-05 PROCEDURE — 81003 URINALYSIS AUTO W/O SCOPE: CPT | Performed by: PHYSICIAN ASSISTANT

## 2020-11-05 PROCEDURE — 96374 THER/PROPH/DIAG INJ IV PUSH: CPT

## 2020-11-05 PROCEDURE — 84484 ASSAY OF TROPONIN QUANT: CPT | Performed by: PHYSICIAN ASSISTANT

## 2020-11-05 PROCEDURE — 25010000002 KETOROLAC TROMETHAMINE PER 15 MG: Performed by: PHYSICIAN ASSISTANT

## 2020-11-05 PROCEDURE — 71045 X-RAY EXAM CHEST 1 VIEW: CPT

## 2020-11-05 PROCEDURE — 93005 ELECTROCARDIOGRAM TRACING: CPT | Performed by: PHYSICIAN ASSISTANT

## 2020-11-05 RX ORDER — SODIUM CHLORIDE 0.9 % (FLUSH) 0.9 %
10 SYRINGE (ML) INJECTION AS NEEDED
Status: DISCONTINUED | OUTPATIENT
Start: 2020-11-05 | End: 2020-11-05 | Stop reason: HOSPADM

## 2020-11-05 RX ORDER — BUTALBITAL, ACETAMINOPHEN AND CAFFEINE 50; 325; 40 MG/1; MG/1; MG/1
1 TABLET ORAL ONCE
Status: COMPLETED | OUTPATIENT
Start: 2020-11-05 | End: 2020-11-05

## 2020-11-05 RX ORDER — KETOROLAC TROMETHAMINE 30 MG/ML
30 INJECTION, SOLUTION INTRAMUSCULAR; INTRAVENOUS EVERY 6 HOURS PRN
Status: DISCONTINUED | OUTPATIENT
Start: 2020-11-05 | End: 2020-11-05 | Stop reason: HOSPADM

## 2020-11-05 RX ADMIN — BUTALBITAL, ACETAMINOPHEN AND CAFFEINE 1 TABLET: 50; 325; 40 TABLET ORAL at 17:33

## 2020-11-05 RX ADMIN — SODIUM CHLORIDE 1000 ML: 9 INJECTION, SOLUTION INTRAVENOUS at 15:22

## 2020-11-05 RX ADMIN — KETOROLAC TROMETHAMINE 30 MG: 30 INJECTION, SOLUTION INTRAMUSCULAR; INTRAVENOUS at 19:02

## 2020-11-05 NOTE — ED PROVIDER NOTES
"Subjective   Patient is a 25-year-old male with history of anxiety, depression, type 2 diabetes, hypertension, hyperlipidemia, osteoarthritis, pericarditis, sleep apnea and AVM presenting to the ER for evaluation of uncontrolled hypertension and hyperglycemia.  Patient states for the past 2 to 3 days he has been having blood sugars that are running in the 400s despite his medication use.  He states he is also noticed his blood pressure has been really elevated over the last 2 days and does not seem to improve with medications.  He states that last night he took his blood pressure and it was 202/112 and he took 2 clonidine and only lowered it approximately 10 points.  He states upon awakening this morning his blood pressure was 206/96.  He states he is currently on Norvasc, losartan and hydralazine with clonidine as needed.  He states he has had a foggy feeling in his head and does have a constant headache recently.  He states that he has had some sharp pains in his chest that last \"only a second\" and resolved.  He states he last felt this pain this morning.  He denies any fever, chills, syncope, dizziness, vision changes, neck pain or stiffness, abdominal pain, nausea, emesis, dysuria, hematuria, extremity weakness, or any other symptoms.  He states that he has been trying to eat clean over the last few days.  He states he has not had any sodas, breads and has been eating protein vegetables.           Review of Systems   Constitutional: Negative for chills and fever.   HENT: Negative for congestion, ear pain, rhinorrhea and sore throat.    Eyes: Negative.    Respiratory: Negative.    Cardiovascular: Positive for chest pain.   Gastrointestinal: Negative.    Genitourinary: Negative.    Musculoskeletal: Negative.    Skin: Negative.    Allergic/Immunologic: Negative for immunocompromised state.   Neurological: Positive for headaches. Negative for dizziness and syncope.   Psychiatric/Behavioral: Negative.        Past " Medical History:   Diagnosis Date   • Anxiety    • AVM (arteriovenous malformation)     brain.  found after workup for severe headache during hypertension. Advised to control BP   • Back pain    • Constipation    • Depression    • DM2 (diabetes mellitus, type 2) (CMS/HCC)     glipizide.  A1C 9 per patient.   • Fatigue    • Fatty liver     transaminitis   • GERD (gastroesophageal reflux disease)    • Headache     REPORTS RELATED TO HTN, constant.  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         Allergies   Allergen Reactions   • Metformin GI Intolerance       Past Surgical History:   Procedure Laterality Date   • COLONOSCOPY N/A 12/4/2018    Procedure: COLONOSCOPY WITH COLD BIOPSY;  Surgeon: Denny Blelamy MD;  Location: [facility] ENDOSCOPY;  Service: Gastroenterology   • EAR TUBES     • EYE MUSCLE SURGERY Bilateral    • TONSILLECTOMY AND ADENOIDECTOMY         Family History   Problem Relation Age of Onset   • Stroke Father    • Hypertension Father    • Hyperlipidemia Father    • Cancer Father    • Hypertension Mother    • Hyperlipidemia Mother    • Cancer Maternal Uncle    • Heart attack Paternal Uncle    • Hyperlipidemia Paternal Uncle    • Hypertension Paternal Uncle    • Cancer Paternal Uncle        Social History     Socioeconomic History   • Marital status: Single     Spouse name: Not on file   • Number of children: Not on file   • Years of education: Not on file   • Highest education level: Not on file   Occupational History   • Occupation:      Employer: [employer]   Tobacco Use   • Smoking status: Never Smoker   • Smokeless tobacco: Never  "Used   • Tobacco comment: Is not around secondhand smoke in his house, but at family's house   Substance and Sexual Activity   • Alcohol use: Yes     Frequency: Monthly or less     Drinks per session: 1 or 2     Comment: OCCASION   • Drug use: No   • Sexual activity: Defer   Social History Narrative    Lives alone           Objective   Physical Exam  Vitals signs and nursing note reviewed.     /95   Pulse 101   Temp 98.7 °F (37.1 °C) (Oral)   Resp 18   Ht 180.3 cm (71\")   Wt (!) 163 kg (360 lb 6.4 oz)   SpO2 97%   BMI 50.27 kg/m²     GEN: No acute distress, sitting up in stretcher.  Awake and alert.  Does not appear toxic.  Head: Normocephalic, atraumatic  Eyes: EOM intact, PERRL  ENT: Mask in place per protocol  Chest: Nontender to palpation  Cardiovascular: Regular rate and rhythm  Lungs: Clear to auscultation bilaterally without adventitious sounds  Abdomen: Soft, nontender, nondistended, no peritoneal sign or guarding s  Extremities: No edema, normal appearance, full ROM.  Neuro: GCS 15.  Cranial nerves II through XII intact with no focal deficits  Psych: Mood and affect are appropriate    Procedures           ED Course  ED Course as of Nov 05 2134   Thu Nov 05, 2020   1535 WBC(!): 12.22 [LA]   1535 Hemoglobin: 16.9 [LA]   1535 Color, UA: Yellow [LA]   1535 Appearance, UA: Clear [LA]   1535 pH, UA: 6.0 [LA]   1535 Specific Gravity, UA: 1.020 [LA]   1535 Glucose(!): >=1000 mg/dL (3+) [LA]   1535 Ketones, UA: Negative [LA]   1535 Bilirubin, UA: Negative [LA]   1535 Blood, UA: Negative [LA]   1535 Protein, UA: Negative [LA]   1535 Leukocytes, UA: Negative [LA]   1535 Nitrite, UA: Negative [LA]   1535 Urobilinogen, UA: 0.2 E.U./dL [LA]   1608 Glucose(!): 313 [LA]   1608 BUN: 17 [LA]   1608 Creatinine: 1.03 [LA]   1608 Sodium(!): 133 [LA]   1608 Potassium: 4.1 [LA]   1608 Chloride(!): 94 [LA]   1608 CO2: 26.1 [LA]   1608 Calcium: 10.4 [LA]   1608 Total Protein: 7.7 [LA]   1608 Albumin: 4.30 [LA]   1608 " ALT (SGPT)(!): 93 [LA]   1608 AST (SGOT): 40 [LA]   1608 Alkaline Phosphatase(!): 136 [LA]   1608 Total Bilirubin: 0.4 [LA]   1608 eGFR Non  Am: 87 [LA]   1608 Globulin: 3.4 [LA]   1608 A/G Ratio: 1.3 [LA]   1608 BUN/Creatinine Ratio: 16.5 [LA]   1608 Anion Gap: 12.9 [LA]   1608 Troponin T: <0.010 [LA]   [id number] 11/5/2020     Narrative & Impression    PROCEDURE: CT HEAD WO CONTRAST-     HISTORY: Hypertension, headache, history of AVM     COMPARISON:  None .     TECHNIQUE: Multiple axial CT images were performed from the foramen  magnum to the vertex without enhancement.      FINDINGS: There is no CT evidence of hemorrhage. There is no mass, mass  effect or midline shift.  There is no hydrocephalus. 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 The paranasal  sinuses are clear. Bone windows reveal no acute osseous abnormalities.     IMPRESSION:  No acute intracranial process.           1459.45 mGy.cm        This study was performed with techniques to keep radiation doses as low  as reasonably achievable (ALARA). Individualized dose reduction  techniques using automated exposure control or adjustment of mA and/or  kV according to the patient size were employed.      This report was finalized on 11/5/2020 4:06 PM by J Luis Monsivais M.D..        [LA]   1608 Narrative & Impression    PROCEDURE: XR CHEST 1 VW-     HISTORY: HTN     COMPARISON: 11/10/2015.     FINDINGS: The heart is normal in size. The mediastinum is unremarkable.  The lungs are clear. There is no pneumothorax.  There are no acute  osseous abnormalities.     IMPRESSION:  No acute cardiopulmonary process.     Continued followup is recommended.     This report was finalized on 11/5/2020 3:32 PM by J Luis Monsivais M.D..        [LA]   1626 EKG interpreted by me reveals sinus rhythm rate 91.  Nonspecific T wave changes.  No ectopy.    [PF]   1653 Patient's current blood pressure is 142/95.  He is resting  comfortably in stretcher.  We are awaiting repeat troponin.  I will give medicine to help treat headache.  Discussed importance of keeping a log of his glucose and blood pressures, follow-up with his primary care provider    [LA]   1805 Troponin T: <0.010 [LA]   1854 Patient still complaining of a mild headache, will give Toradol.  I did discuss obtaining lumbar puncture to check pressure but he refused.    [LA]      ED Course User Index  [LA] Amy Cohen PA-C  [PF] Wilmer Crawford, DO                                           MDM  Number of Diagnoses or Management Options  Elevated blood pressure reading with diagnosis of hypertension:   Hyperglycemia:   Nonintractable episodic headache, unspecified headache type:   Diagnosis management comments: On arrival, patient does have an elevated blood pressure of 169/118, afebrile, no respiratory distress.  Differential includes migraine, electrolyte abnormalities, intracranial hemorrhage, uncontrolled hypertension, hyperglycemia, dehydration, and other concerns.  I have low concern for ACS but given patient's comorbidities, will obtain basic labs, troponin, EKG, chest x-ray.  Will also obtain a CT of the head especially given his history of AVM.  Will give IV fluids here.    EKG was interpreted by the attending.  Patient's glucose was in the 300s.  He had a leukocytosis of 12.  His chloride and sodium were a bit low but no other significant lateral abnormalities.  Initial troponin was not elevated.  Chest x-ray is read by radiology revealed no acute abnormality.  CT of the head is read by the radiologist revealing no acute abnormality.  Patient blood pressure did improve without medications to systolic in the 140s.  He still did have a headache so we treated him with Fioricet.  His serial troponin remained negative.  Discussed the case with Dr. Crawford.  Patient states he still had a headache after the Fioricet, gave Toradol.  I did discuss obtaining a lumbar puncture  to assess intracranial pressure but patient refused.  I discussed importance of logging his blood pressure and glucose, make diet changes, exercising and follow-up with his primary care provider.  Discussed that if his headache persisted, may need to follow-up with neurology.  We discussed very strict return precautions.  He verbalized understanding and was in agreement with this plan of care.       Amount and/or Complexity of Data Reviewed  Clinical lab tests: reviewed and ordered  Tests in the radiology section of CPT®: reviewed and ordered  Discussion of test results with the performing providers: yes  Review and summarize past medical records: yes  Discuss the patient with other providers: yes    Risk of Complications, Morbidity, and/or Mortality  Presenting problems: moderate  Diagnostic procedures: moderate  Management options: low    Patient Progress  Patient progress: stable      Final diagnoses:   Elevated blood pressure reading with diagnosis of hypertension   Hyperglycemia   Nonintractable episodic headache, unspecified headache type            Amy Cohen PA-C  11/05/20 1202

## 2020-11-06 NOTE — DISCHARGE INSTRUCTIONS
Take your home medications as directed. Drink plenty of fluids to stay well-hydrated.  Watch your diet and try to refrain from sugars, starches, and carbohydrates.  Continue to monitor your blood sugar at home as well as your blood pressure.  You may keep a log of these numbers he can palpate a primary care provider.  They may need to do further outpatient evaluation or medicine changes.  Alternate ibuprofen and Tylenol to help with your headaches.    Follow-up may need to send you to cardiology for further work-up and to help monitor your blood pressure. You may need to see neurology if your headaches persist.  Return here to the ER for return to the ER for any change, worsening symptoms, or any additional concerns including but not limited to severe or worsening headache, vision changes, dizziness or syncope, chest pain, shortness of breath.

## 2021-06-18 ENCOUNTER — TRANSCRIBE ORDERS (OUTPATIENT)
Dept: ADMINISTRATIVE | Facility: HOSPITAL | Age: 26
End: 2021-06-18

## 2021-06-18 DIAGNOSIS — R74.8 LIVER ENZYME ELEVATION: Primary | ICD-10-CM

## 2021-07-02 ENCOUNTER — HOSPITAL ENCOUNTER (OUTPATIENT)
Dept: ULTRASOUND IMAGING | Facility: HOSPITAL | Age: 26
Discharge: HOME | End: 2021-07-02
Admitting: NURSE PRACTITIONER

## 2021-07-02 DIAGNOSIS — R74.8 LIVER ENZYME ELEVATION: ICD-10-CM

## 2021-07-02 PROCEDURE — 76700 US EXAM ABDOM COMPLETE: CPT

## 2021-08-05 ENCOUNTER — HOSPITAL ENCOUNTER (EMERGENCY)
Facility: HOSPITAL | Age: 26
Discharge: HOME | End: 2021-08-05
Attending: EMERGENCY MEDICINE | Admitting: EMERGENCY MEDICINE

## 2021-08-05 ENCOUNTER — APPOINTMENT (OUTPATIENT)
Dept: GENERAL RADIOLOGY | Facility: HOSPITAL | Age: 26
End: 2021-08-05

## 2021-08-05 VITALS
WEIGHT: 315 LBS | SYSTOLIC BLOOD PRESSURE: 144 MMHG | HEART RATE: 84 BPM | BODY MASS INDEX: 45.1 KG/M2 | TEMPERATURE: 97.9 F | RESPIRATION RATE: 16 BRPM | HEIGHT: 70 IN | DIASTOLIC BLOOD PRESSURE: 103 MMHG | OXYGEN SATURATION: 98 %

## 2021-08-05 DIAGNOSIS — M79.672 FOOT PAIN, LEFT: Primary | ICD-10-CM

## 2021-08-05 PROCEDURE — 73630 X-RAY EXAM OF FOOT: CPT

## 2021-08-05 PROCEDURE — 99283 EMERGENCY DEPT VISIT LOW MDM: CPT

## 2021-08-05 RX ORDER — HYDROCODONE BITARTRATE AND ACETAMINOPHEN 7.5; 325 MG/1; MG/1
1 TABLET ORAL ONCE
Status: COMPLETED | OUTPATIENT
Start: 2021-08-05 | End: 2021-08-05

## 2021-08-05 RX ORDER — MELOXICAM 7.5 MG/1
7.5 TABLET ORAL DAILY
Qty: 14 TABLET | Refills: 0 | Status: SHIPPED | OUTPATIENT
Start: 2021-08-05 | End: 2022-08-18

## 2021-08-05 RX ADMIN — HYDROCODONE BITARTRATE AND ACETAMINOPHEN 1 TABLET: 7.5; 325 TABLET ORAL at 17:49

## 2021-08-05 NOTE — ED PROVIDER NOTES
Subjective   History of Present Illness  Is a 25-year-old male who comes in today complaining of left foot pain.  He reports her symptoms started yesterday.  He reports he has a bone disorder that causes bone spurs.  He states he was treated with Mobic the last episode which did relieve his symptoms.  He denies any injury.  Review of Systems   Constitutional: Negative.    HENT: Negative.    Eyes: Negative.    Respiratory: Negative.    Cardiovascular: Negative.    Gastrointestinal: Negative.    Genitourinary: Negative.    Musculoskeletal: Positive for arthralgias.   Skin: Negative.    Neurological: Negative.    Psychiatric/Behavioral: Negative.        Past Medical History:   Diagnosis Date   • Anxiety    • AVM (arteriovenous malformation)     brain.  found after workup for severe headache during hypertension. Advised to control BP   • Back pain    • Constipation    • Depression    • DM2 (diabetes mellitus, type 2) (CMS/[facility])     glipizide.  A1C 9 per patient.   • Fatigue    • Fatty liver     transaminitis   • GERD (gastroesophageal reflux disease)    • Headache     REPORTS RELATED TO HTN, constant.  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         Allergies   Allergen Reactions   • Metformin GI Intolerance       Past Surgical History:   Procedure Laterality Date   • COLONOSCOPY N/A 12/4/2018    Procedure: COLONOSCOPY WITH COLD BIOPSY;  Surgeon: Denny Bellamy MD;  Location: [facility] ENDOSCOPY;  Service: Gastroenterology   • EAR TUBES     • EYE MUSCLE SURGERY Bilateral    • TONSILLECTOMY AND ADENOIDECTOMY         Family History   Problem Relation Age of Onset   •  Stroke Father    • Hypertension Father    • Hyperlipidemia Father    • Cancer Father    • Hypertension Mother    • Hyperlipidemia Mother    • Cancer Maternal Uncle    • Heart attack Paternal Uncle    • Hyperlipidemia Paternal Uncle    • Hypertension Paternal Uncle    • Cancer Paternal Uncle        Social History     Socioeconomic History   • Marital status: Single     Spouse name: Not on file   • Number of children: Not on file   • Years of education: Not on file   • Highest education level: Not on file   Tobacco Use   • Smoking status: Never Smoker   • Smokeless tobacco: Never Used   • Tobacco comment: Is not around secondhand smoke in his house, but at family's house   Substance and Sexual Activity   • Alcohol use: Yes     Comment: OCCASION   • Drug use: No   • Sexual activity: Defer           Objective   Physical Exam  Vitals and nursing note reviewed.   Constitutional:       Appearance: Normal appearance. He is obese.   Neurological:      Mental Status: He is alert.     GEN: No acute distress  Head: Normocephalic, atraumatic  Eyes: Pupils equal round reactive to light  ENT: Posterior pharynx normal in appearance, oral mucosa is moist  Chest: Nontender to palpation  Cardiovascular: Regular rate  Lungs: Clear to auscultation bilaterally  Abdomen: Soft, nontender, nondistended, no peritoneal signs  Extremities: No edema, normal appearance, tender left foot.  Neuro: GCS 15  Psych: Mood and affect are appropriate      Procedures           ED Course  ED Course as of Aug 05 1823   Thu Aug 05, 2021   1821 I have advised the patient to follow up with his pcp. We will refer him to ortho. I have given him strict return to care instructions and he is agreeable to this plan of care.     [TW]      ED Course User Index  [TW] Iveth Bird, APRN                                           MDM  Number of Diagnoses or Management Options     Amount and/or Complexity of Data Reviewed  Tests in the radiology section of CPT®:  ordered and reviewed  Review and summarize past medical records: yes  Discuss the patient with other providers: yes  Independent visualization of images, tracings, or specimens: yes    Risk of Complications, Morbidity, and/or Mortality  Presenting problems: low  Diagnostic procedures: low  Management options: low        Final diagnoses:   Foot pain, left       ED Disposition  ED Disposition     ED Disposition Condition Comment    Discharge Stable           Ivy Lozano, APRN  [address] [place] [address] [phone]    Schedule an appointment as soon as possible for a visit            Medication List      Changed    cloNIDine 0.1 MG tablet  Commonly known as: Catapres  Take 1 tablet by mouth Every 6 (Six) Hours. PRN for SBP>150 mm Hg and/or DBP> 100 mm Hg  Take 2 tablets PO as needed for SBP> 200 mm Hg  What changed: when to take this     * meloxicam 7.5 MG tablet  Commonly known as: Mobic  Take 1 tablet by mouth Daily.  What changed: Another medication with the same name was added. Make sure you understand how and when to take each.     * meloxicam 7.5 MG tablet  Commonly known as: MOBIC  Take 1 tablet by mouth Daily.  What changed: You were already taking a medication with the same name, and this prescription was added. Make sure you understand how and when to take each.         * This list has 2 medication(s) that are the same as other medications prescribed for you. Read the directions carefully, and ask your doctor or other care provider to review them with you.               Where to Get Your Medications      These medications were sent to [place], [place] - [address] [place] [address] - [phone] [place] [id number] [address] [address]    Phone: [phone]   · meloxicam 7.5 MG tablet          Iveth Bird, TRACEE  08/05/21 3692

## 2021-10-16 ENCOUNTER — APPOINTMENT (OUTPATIENT)
Dept: GENERAL RADIOLOGY | Facility: HOSPITAL | Age: 26
End: 2021-10-16

## 2021-10-16 ENCOUNTER — HOSPITAL ENCOUNTER (EMERGENCY)
Facility: HOSPITAL | Age: 26
Discharge: HOME | End: 2021-10-16
Attending: EMERGENCY MEDICINE | Admitting: EMERGENCY MEDICINE

## 2021-10-16 VITALS
BODY MASS INDEX: 44.1 KG/M2 | WEIGHT: 315 LBS | RESPIRATION RATE: 16 BRPM | HEART RATE: 96 BPM | SYSTOLIC BLOOD PRESSURE: 151 MMHG | DIASTOLIC BLOOD PRESSURE: 116 MMHG | TEMPERATURE: 99 F | HEIGHT: 71 IN | OXYGEN SATURATION: 96 %

## 2021-10-16 DIAGNOSIS — S62.346A NONDISPLACED FRACTURE OF BASE OF FIFTH METACARPAL BONE, RIGHT HAND, INITIAL ENCOUNTER FOR CLOSED FRACTURE: Primary | ICD-10-CM

## 2021-10-16 PROCEDURE — 73130 X-RAY EXAM OF HAND: CPT

## 2021-10-16 PROCEDURE — 99283 EMERGENCY DEPT VISIT LOW MDM: CPT

## 2021-10-16 RX ORDER — IBUPROFEN 800 MG/1
800 TABLET ORAL ONCE
Status: COMPLETED | OUTPATIENT
Start: 2021-10-16 | End: 2021-10-16

## 2021-10-16 RX ADMIN — IBUPROFEN 800 MG: 800 TABLET ORAL at 10:47

## 2021-10-16 NOTE — ED PROVIDER NOTES
Subjective   25 year old male presents today for c/o pain in right hand after he punched a wall. He says he walked into work and got upset and punched the wall. He was not clocked in yet. He has pain, swelling, bruising to his right hand. He has limited movement with pain. No other injury noted.           Review of Systems   Constitutional: Negative.    HENT: Negative.    Eyes: Negative.    Respiratory: Negative.    Cardiovascular: Negative.    Gastrointestinal: Negative.    Endocrine: Negative.    Genitourinary: Negative.    Musculoskeletal: Positive for joint swelling.   Skin: Positive for color change.   Allergic/Immunologic: Negative.    Neurological: Negative.    Hematological: Negative.    Psychiatric/Behavioral: Negative.        Past Medical History:   Diagnosis Date   • Anxiety    • AVM (arteriovenous malformation)     brain.  found after workup for severe headache during hypertension. Advised to control BP   • Back pain    • Constipation    • Depression    • DM2 (diabetes mellitus, type 2) (HCC)     glipizide.  A1C 9 per patient.   • Fatigue    • Fatty liver     transaminitis   • GERD (gastroesophageal reflux disease)    • Headache     REPORTS RELATED TO HTN, constant.  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         Allergies   Allergen Reactions   • Metformin GI Intolerance       Past Surgical History:   Procedure Laterality Date   • COLONOSCOPY N/A 12/4/2018    Procedure: COLONOSCOPY WITH COLD BIOPSY;  Surgeon: Denny Bellamy MD;  Location: [facility] ENDOSCOPY;  Service: Gastroenterology   • EAR TUBES     • EYE MUSCLE SURGERY Bilateral    • TONSILLECTOMY  AND ADENOIDECTOMY         Family History   Problem Relation Age of Onset   • Stroke Father    • Hypertension Father    • Hyperlipidemia Father    • Cancer Father    • Hypertension Mother    • Hyperlipidemia Mother    • Cancer Maternal Uncle    • Heart attack Paternal Uncle    • Hyperlipidemia Paternal Uncle    • Hypertension Paternal Uncle    • Cancer Paternal Uncle        Social History     Socioeconomic History   • Marital status: Single   Tobacco Use   • Smoking status: Never Smoker   • Smokeless tobacco: Never Used   • Tobacco comment: Is not around secondhand smoke in his house, but at family's house   Substance and Sexual Activity   • Alcohol use: Yes     Comment: OCCASION   • Drug use: No   • Sexual activity: Defer           Objective   Physical Exam  Vitals and nursing note reviewed.   Constitutional:       Appearance: Normal appearance.   HENT:      Head: Normocephalic and atraumatic.      Nose: Nose normal.   Eyes:      Extraocular Movements: Extraocular movements intact.      Conjunctiva/sclera: Conjunctivae normal.      Pupils: Pupils are equal, round, and reactive to light.   Cardiovascular:      Rate and Rhythm: Normal rate and regular rhythm.      Pulses: Normal pulses.   Musculoskeletal:         General: Swelling, tenderness and signs of injury present.      Cervical back: Normal range of motion.   Skin:     General: Skin is warm.      Capillary Refill: Capillary refill takes less than 2 seconds.      Findings: Bruising present.   Neurological:      General: No focal deficit present.      Mental Status: He is alert.   Psychiatric:         Mood and Affect: Mood normal.         Procedures  Placed orthoglass splint on right hand for boxers fracture.          ED Course         Patient given Ibuprofen for pain and ice pack which has helped with his pain. He was placed in orthoglass splint and given Dr Quinones's information to contact for outpatient follow up                                  MDM      Final  diagnoses:   Nondisplaced fracture of base of fifth metacarpal bone, right hand, initial encounter for closed fracture   Right Fifth metacarpal fracture    ED Disposition  ED Disposition     ED Disposition Condition Comment    Discharge Stable           Ivy Lozano, TRACEE  [address]   [facility] [address] [phone]    In 2 days  for follow up care    Isaiah Quinones MD  [address] [facility] [address]  [facility] [address] [phone]               Medication List      Changed    cloNIDine 0.1 MG tablet  Commonly known as: Catapres  Take 1 tablet by mouth Every 6 (Six) Hours. PRN for SBP>150 mm Hg and/or DBP> 100 mm Hg  Take 2 tablets PO as needed for SBP> 200 mm Hg  What changed: when to take this             Shauna Farrell APRN  10/16/21 1141       Shauna Farrell APRN  10/16/21 1146

## 2022-08-18 ENCOUNTER — OFFICE VISIT (OUTPATIENT)
Dept: NEUROLOGY | Facility: CLINIC | Age: 27
End: 2022-08-18

## 2022-08-18 VITALS
WEIGHT: 315 LBS | TEMPERATURE: 97.1 F | BODY MASS INDEX: 44.1 KG/M2 | HEART RATE: 72 BPM | DIASTOLIC BLOOD PRESSURE: 90 MMHG | HEIGHT: 71 IN | SYSTOLIC BLOOD PRESSURE: 140 MMHG | OXYGEN SATURATION: 97 %

## 2022-08-18 DIAGNOSIS — Z20.822 ENCOUNTER FOR PREPROCEDURE SCREENING LABORATORY TESTING FOR COVID-19: ICD-10-CM

## 2022-08-18 DIAGNOSIS — K21.9 GASTROESOPHAGEAL REFLUX DISEASE, UNSPECIFIED WHETHER ESOPHAGITIS PRESENT: ICD-10-CM

## 2022-08-18 DIAGNOSIS — R06.83 SNORING: Primary | ICD-10-CM

## 2022-08-18 DIAGNOSIS — G47.9 RESTLESS SLEEPER: ICD-10-CM

## 2022-08-18 DIAGNOSIS — Z01.812 ENCOUNTER FOR PREPROCEDURE SCREENING LABORATORY TESTING FOR COVID-19: ICD-10-CM

## 2022-08-18 DIAGNOSIS — I10 ESSENTIAL HYPERTENSION: ICD-10-CM

## 2022-08-18 DIAGNOSIS — G47.19 EXCESSIVE DAYTIME SLEEPINESS: ICD-10-CM

## 2022-08-18 PROCEDURE — 99202 OFFICE O/P NEW SF 15 MIN: CPT | Performed by: NURSE PRACTITIONER

## 2022-08-18 RX ORDER — ALOGLIPTIN 25 MG/1
TABLET, FILM COATED ORAL
COMMUNITY

## 2022-08-18 NOTE — PROGRESS NOTES
New Sleep Patient Office Visit      Patient Name: Ken Cheung  : 1995   MRN: [id number]     Referring Physician: Ivy Lozano APRN    Chief Complaint:    Chief Complaint   Patient presents with   • Advice Only     NP, HERE TO EST CARE FOR JONO.PATIENT C/O SNORNG, GASPING FOR AIR, FATIGUE, HEADACHES UPON WAKENING.        History of Present Illness: Ken Cheung is a 26 y.o. male who is here today to establish care with Sleep Medicine.  Sleep questionnaire reviewed- he is able to initiate sleep easily, he wakes up 3-4 times during sleep and has difficulty falling back to sleep, he snores very loudly, he sleeps 4-5 hours per night, he experiences restless or disturbed sleep, he has awakened snorting and gasping, he wakes up with a dry mouth, he wakes up with a headache, he experiences daytime sleepiness and decreased concentration.  Additional risk factors- BMI 49, large neck circumference, HTN, diabetes, GERD, family history of significant JONO.     Erlanger Score: 12    Subjective      Review of Systems:   Review of Systems   Constitutional: Positive for fatigue. Negative for fever, unexpected weight gain and unexpected weight loss.   HENT: Negative for hearing loss, sore throat, swollen glands, tinnitus and trouble swallowing.    Eyes: Negative for blurred vision, double vision, photophobia and visual disturbance.   Respiratory: Positive for chest tightness. Negative for cough.    Cardiovascular: Negative for chest pain, palpitations and leg swelling.   Gastrointestinal: Negative for constipation, diarrhea and nausea.   Endocrine: Negative for cold intolerance and heat intolerance.   Musculoskeletal: Negative for gait problem, neck pain and neck stiffness.   Skin: Negative for color change and rash.   Allergic/Immunologic: Negative for environmental allergies and food allergies.   Neurological: Positive for headache. Negative for dizziness, syncope, facial asymmetry, speech  difficulty, weakness, memory problem and confusion.   Psychiatric/Behavioral: Negative for agitation, behavioral problems and depressed mood. The patient is not nervous/anxious.        Past Medical History:   Past Medical History:   Diagnosis Date   • Anxiety    • AVM (arteriovenous malformation)     brain.  found after workup for severe headache during hypertension. Advised to control BP   • Back pain    • Constipation    • Depression    • DM2 (diabetes mellitus, type 2) (HCC)     glipizide.  A1C 9 per patient.   • Fatigue    • Fatty liver     transaminitis   • GERD (gastroesophageal reflux disease)    • Headache     REPORTS RELATED TO HTN, constant.  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         Past Surgical History:   Past Surgical History:   Procedure Laterality Date   • COLONOSCOPY N/A 12/4/2018    Procedure: COLONOSCOPY WITH COLD BIOPSY;  Surgeon: Denny Bellamy MD;  Location: [facility] ENDOSCOPY;  Service: Gastroenterology   • EAR TUBES     • EYE MUSCLE SURGERY Bilateral    • TONSILLECTOMY AND ADENOIDECTOMY         Family History:   Family History   Problem Relation Age of Onset   • Stroke Father    • Hypertension Father    • Hyperlipidemia Father    • Cancer Father    • Hypertension Mother    • Hyperlipidemia Mother    • Cancer Maternal Uncle    • Heart attack Paternal Uncle    • Hyperlipidemia Paternal Uncle    • Hypertension Paternal Uncle    • Cancer Paternal Uncle        Social History:   Social History     Socioeconomic History   • Marital status: Single   Tobacco Use   • Smoking status: Never Smoker   • Smokeless tobacco: Never Used   • Tobacco  "comment: Is not around secondhand smoke in his house, but at family's house   Vaping Use   • Vaping Use: Never used   Substance and Sexual Activity   • Alcohol use: Yes     Comment: OCCASION   • Drug use: No   • Sexual activity: Defer       Medications:     Current Outpatient Medications:   •  Alogliptin Benzoate 25 MG tablet, Take  by mouth., Disp: , Rfl:   •  amLODIPine (NORVASC) 10 MG tablet, Take 10 mg by mouth Daily., Disp: , Rfl:   •  Empagliflozin (JARDIANCE PO), Take 25 mg by mouth., Disp: , Rfl:   •  FAMOTIDINE PO, Take  by mouth., Disp: , Rfl:   •  FLUoxetine (PROzac) 20 MG capsule, Take 60 mg by mouth Daily., Disp: , Rfl:   •  glipiZIDE (GLUCOTROL XL) 5 MG ER tablet, Take 5 mg by mouth Daily., Disp: , Rfl:   •  LOSARTAN POTASSIUM-HCTZ PO, Take  by mouth., Disp: , Rfl:   •  ONETOUCH DELICA LANCETS 33G misc, , Disp: , Rfl:   •  pravastatin (PRAVACHOL) 40 MG tablet, Take 40 mg by mouth Daily., Disp: , Rfl:   •  CloNIDine (CATAPRES) 0.1 MG tablet, Take 1 tablet by mouth Every 6 (Six) Hours. PRN for SBP>150 mm Hg and/or DBP> 100 mm Hg Take 2 tablets PO as needed for SBP> 200 mm Hg (Patient taking differently: Take 0.1 mg by mouth Take As Directed. PRN for SBP>150 mm Hg and/or DBP> 100 mm Hg Take 2 tablets PO as needed for SBP> 200 mm Hg), Disp: 30 tablet, Rfl: 11    Allergies:   Allergies   Allergen Reactions   • Metformin GI Intolerance       Objective     Physical Exam:  Vital Signs:   Vitals:    08/18/22 1319   BP: 140/90   BP Location: Right arm   Patient Position: Sitting   Cuff Size: Adult   Pulse: 72   Temp: 97.1 °F (36.2 °C)   TempSrc: Temporal   SpO2: 97%   Weight: (!) 160 kg (353 lb)   Height: 180.3 cm (71\")   PainSc: 0-No pain     BMI: Body mass index is 49.23 kg/m².  Neck Circumference: 20.5 inches    Physical Exam  Vitals and nursing note reviewed.   Constitutional:       General: He is not in acute distress.     Appearance: He is well-developed. He is obese. He is not diaphoretic.   HENT:      " Head: Normocephalic and atraumatic.      Comments: Mallampati 4  Eyes:      Extraocular Movements: Extraocular movements intact.      Conjunctiva/sclera: Conjunctivae normal.      Pupils: Pupils are equal, round, and reactive to light.   Neck:      Trachea: Trachea normal.   Cardiovascular:      Rate and Rhythm: Normal rate and regular rhythm.      Heart sounds: Normal heart sounds. No murmur heard.    No friction rub. No gallop.   Pulmonary:      Effort: Pulmonary effort is normal. No respiratory distress.      Breath sounds: Normal breath sounds. No wheezing or rales.   Musculoskeletal:         General: Normal range of motion.   Skin:     General: Skin is warm and dry.      Findings: No rash.   Neurological:      Mental Status: He is alert and oriented to person, place, and time.   Psychiatric:         Mood and Affect: Mood normal.         Behavior: Behavior normal.         Thought Content: Thought content normal.         Judgment: Judgment normal.         Assessment / Plan      Assessment/Plan:   Diagnoses and all orders for this visit:    1. Snoring (Primary)  -     Polysomnography 4 or More Parameters With CPAP; Future    2. Restless sleeper  -     Polysomnography 4 or More Parameters With CPAP; Future    3. Essential hypertension  -     Polysomnography 4 or More Parameters With CPAP; Future    4. Gastroesophageal reflux disease, unspecified whether esophagitis present  -     Polysomnography 4 or More Parameters With CPAP; Future    5. Excessive daytime sleepiness  -     Polysomnography 4 or More Parameters With CPAP; Future    6. Encounter for preprocedure screening laboratory testing for COVID-19  -     COVID PRE-OP / PRE-PROCEDURE SCREENING ORDER (NO ISOLATION) - Swab, Nasopharynx; Future    7. BMI 45.0-49.9, adult (HCC)  -     Polysomnography 4 or More Parameters With CPAP; Future    *Education on JONO provided today. Advised patient to avoid driving if drowsy. Encouraged weight loss with a BMI goal of 24.    *Patient is agreeable to a PSG.     Follow Up:   Return in about 4 months (around 12/18/2022) for F/U Obstructive Sleep Apnea.    I have advised the patient the need to continue the use of CPAP.  Gold standard for treatment of sleep apnea includes weight loss, use of cpap and avoidance of alcohol.  Untreated JONO may increase the risk for development of hypertension, stroke, myocardial infarction, diabetes, cardiovascular disease, work-related issues and driving accidents. I have counseled and advised the patient to avoid driving or operating heavy/dangerous equipment if feeling drowsy.     TRACEE Porter, FNP-C  [facility] Neurology and Sleep Medicine       Please note that portions of this note may have been completed with a voice recognition program. Efforts were made to edit the dictations, but occasionally words are mistranscribed.

## 2022-08-24 ENCOUNTER — PATIENT ROUNDING (BHMG ONLY) (OUTPATIENT)
Dept: NEUROLOGY | Facility: CLINIC | Age: 27
End: 2022-08-24

## 2022-09-05 ENCOUNTER — APPOINTMENT (OUTPATIENT)
Dept: CT IMAGING | Facility: HOSPITAL | Age: 27
End: 2022-09-05

## 2022-09-05 ENCOUNTER — HOSPITAL ENCOUNTER (EMERGENCY)
Facility: HOSPITAL | Age: 27
Discharge: HOME | End: 2022-09-05
Attending: EMERGENCY MEDICINE | Admitting: EMERGENCY MEDICINE

## 2022-09-05 VITALS
OXYGEN SATURATION: 97 % | RESPIRATION RATE: 16 BRPM | WEIGHT: 315 LBS | SYSTOLIC BLOOD PRESSURE: 151 MMHG | HEIGHT: 70 IN | DIASTOLIC BLOOD PRESSURE: 83 MMHG | TEMPERATURE: 99 F | HEART RATE: 110 BPM | BODY MASS INDEX: 45.1 KG/M2

## 2022-09-05 DIAGNOSIS — R30.0 DYSURIA: ICD-10-CM

## 2022-09-05 DIAGNOSIS — R10.9 FLANK PAIN: Primary | ICD-10-CM

## 2022-09-05 LAB
ALBUMIN SERPL-MCNC: 3.9 G/DL (ref 3.5–5.2)
ALBUMIN/GLOB SERPL: 1.1 G/DL
ALP SERPL-CCNC: 101 U/L (ref 39–117)
ALT SERPL W P-5'-P-CCNC: 39 U/L (ref 1–41)
ANION GAP SERPL CALCULATED.3IONS-SCNC: 11.8 MMOL/L (ref 5–15)
AST SERPL-CCNC: 18 U/L (ref 1–40)
BASOPHILS # BLD AUTO: 0.07 10*3/MM3 (ref 0–0.2)
BASOPHILS NFR BLD AUTO: 0.5 % (ref 0–1.5)
BILIRUB SERPL-MCNC: 0.7 MG/DL (ref 0–1.2)
BILIRUB UR QL STRIP: NEGATIVE
BUN SERPL-MCNC: 11 MG/DL (ref 6–20)
BUN/CREAT SERPL: 10.4 (ref 7–25)
CALCIUM SPEC-SCNC: 9.5 MG/DL (ref 8.6–10.5)
CHLORIDE SERPL-SCNC: 95 MMOL/L (ref 98–107)
CLARITY UR: CLEAR
CO2 SERPL-SCNC: 24.2 MMOL/L (ref 22–29)
COLOR UR: YELLOW
CREAT SERPL-MCNC: 1.06 MG/DL (ref 0.76–1.27)
DEPRECATED RDW RBC AUTO: 41.3 FL (ref 37–54)
EGFRCR SERPLBLD CKD-EPI 2021: 98 ML/MIN/1.73
EOSINOPHIL # BLD AUTO: 0.15 10*3/MM3 (ref 0–0.4)
EOSINOPHIL NFR BLD AUTO: 1.1 % (ref 0.3–6.2)
ERYTHROCYTE [DISTWIDTH] IN BLOOD BY AUTOMATED COUNT: 12.7 % (ref 12.3–15.4)
GLOBULIN UR ELPH-MCNC: 3.6 GM/DL
GLUCOSE SERPL-MCNC: 168 MG/DL (ref 65–99)
GLUCOSE UR STRIP-MCNC: ABNORMAL MG/DL
HCT VFR BLD AUTO: 50.2 % (ref 37.5–51)
HGB BLD-MCNC: 17.6 G/DL (ref 13–17.7)
HGB UR QL STRIP.AUTO: NEGATIVE
HOLD SPECIMEN: NORMAL
IMM GRANULOCYTES # BLD AUTO: 0.06 10*3/MM3 (ref 0–0.05)
IMM GRANULOCYTES NFR BLD AUTO: 0.4 % (ref 0–0.5)
KETONES UR QL STRIP: ABNORMAL
LEUKOCYTE ESTERASE UR QL STRIP.AUTO: NEGATIVE
LIPASE SERPL-CCNC: 29 U/L (ref 13–60)
LYMPHOCYTES # BLD AUTO: 1.08 10*3/MM3 (ref 0.7–3.1)
LYMPHOCYTES NFR BLD AUTO: 7.8 % (ref 19.6–45.3)
MCH RBC QN AUTO: 31.1 PG (ref 26.6–33)
MCHC RBC AUTO-ENTMCNC: 35.1 G/DL (ref 31.5–35.7)
MCV RBC AUTO: 88.7 FL (ref 79–97)
MONOCYTES # BLD AUTO: 0.87 10*3/MM3 (ref 0.1–0.9)
MONOCYTES NFR BLD AUTO: 6.3 % (ref 5–12)
NEUTROPHILS NFR BLD AUTO: 11.57 10*3/MM3 (ref 1.7–7)
NEUTROPHILS NFR BLD AUTO: 83.9 % (ref 42.7–76)
NITRITE UR QL STRIP: NEGATIVE
NRBC BLD AUTO-RTO: 0 /100 WBC (ref 0–0.2)
PH UR STRIP.AUTO: 5.5 [PH] (ref 5–8)
PLATELET # BLD AUTO: 292 10*3/MM3 (ref 140–450)
PMV BLD AUTO: 9.8 FL (ref 6–12)
POTASSIUM SERPL-SCNC: 3.7 MMOL/L (ref 3.5–5.2)
PROT SERPL-MCNC: 7.5 G/DL (ref 6–8.5)
PROT UR QL STRIP: NEGATIVE
RBC # BLD AUTO: 5.66 10*6/MM3 (ref 4.14–5.8)
SODIUM SERPL-SCNC: 131 MMOL/L (ref 136–145)
SP GR UR STRIP: >=1.03 (ref 1–1.03)
UROBILINOGEN UR QL STRIP: ABNORMAL
WBC NRBC COR # BLD: 13.8 10*3/MM3 (ref 3.4–10.8)
WHOLE BLOOD HOLD COAG: NORMAL
WHOLE BLOOD HOLD SPECIMEN: NORMAL

## 2022-09-05 PROCEDURE — 25010000002 KETOROLAC TROMETHAMINE PER 15 MG: Performed by: PHYSICIAN ASSISTANT

## 2022-09-05 PROCEDURE — 96361 HYDRATE IV INFUSION ADD-ON: CPT

## 2022-09-05 PROCEDURE — 80053 COMPREHEN METABOLIC PANEL: CPT | Performed by: PHYSICIAN ASSISTANT

## 2022-09-05 PROCEDURE — 36415 COLL VENOUS BLD VENIPUNCTURE: CPT

## 2022-09-05 PROCEDURE — 96374 THER/PROPH/DIAG INJ IV PUSH: CPT

## 2022-09-05 PROCEDURE — 99283 EMERGENCY DEPT VISIT LOW MDM: CPT

## 2022-09-05 PROCEDURE — 81003 URINALYSIS AUTO W/O SCOPE: CPT | Performed by: PHYSICIAN ASSISTANT

## 2022-09-05 PROCEDURE — 85025 COMPLETE CBC W/AUTO DIFF WBC: CPT | Performed by: PHYSICIAN ASSISTANT

## 2022-09-05 PROCEDURE — 74176 CT ABD & PELVIS W/O CONTRAST: CPT

## 2022-09-05 PROCEDURE — 96375 TX/PRO/DX INJ NEW DRUG ADDON: CPT

## 2022-09-05 PROCEDURE — 83690 ASSAY OF LIPASE: CPT | Performed by: PHYSICIAN ASSISTANT

## 2022-09-05 PROCEDURE — 25010000002 MORPHINE PER 10 MG: Performed by: EMERGENCY MEDICINE

## 2022-09-05 RX ORDER — CEFDINIR 300 MG/1
300 CAPSULE ORAL 2 TIMES DAILY
Qty: 14 CAPSULE | Refills: 0 | Status: SHIPPED | OUTPATIENT
Start: 2022-09-05 | End: 2022-09-12

## 2022-09-05 RX ORDER — SODIUM CHLORIDE 0.9 % (FLUSH) 0.9 %
10 SYRINGE (ML) INJECTION AS NEEDED
Status: DISCONTINUED | OUTPATIENT
Start: 2022-09-05 | End: 2022-09-05 | Stop reason: HOSPADM

## 2022-09-05 RX ORDER — CYCLOBENZAPRINE HCL 10 MG
10 TABLET ORAL 2 TIMES DAILY PRN
Qty: 20 TABLET | Refills: 0 | Status: SHIPPED | OUTPATIENT
Start: 2022-09-05

## 2022-09-05 RX ORDER — KETOROLAC TROMETHAMINE 30 MG/ML
15 INJECTION, SOLUTION INTRAMUSCULAR; INTRAVENOUS ONCE
Status: COMPLETED | OUTPATIENT
Start: 2022-09-05 | End: 2022-09-05

## 2022-09-05 RX ORDER — MELOXICAM 7.5 MG/1
7.5 TABLET ORAL DAILY
Qty: 20 TABLET | Refills: 0 | Status: SHIPPED | OUTPATIENT
Start: 2022-09-05

## 2022-09-05 RX ORDER — MORPHINE SULFATE 4 MG/ML
4 INJECTION, SOLUTION INTRAMUSCULAR; INTRAVENOUS ONCE
Status: COMPLETED | OUTPATIENT
Start: 2022-09-05 | End: 2022-09-05

## 2022-09-05 RX ADMIN — KETOROLAC TROMETHAMINE 15 MG: 30 INJECTION, SOLUTION INTRAMUSCULAR; INTRAVENOUS at 20:15

## 2022-09-05 RX ADMIN — SODIUM CHLORIDE 1000 ML: 9 INJECTION, SOLUTION INTRAVENOUS at 20:15

## 2022-09-05 RX ADMIN — MORPHINE SULFATE 4 MG: 4 INJECTION, SOLUTION INTRAMUSCULAR; INTRAVENOUS at 21:13

## 2022-09-06 NOTE — ED PROVIDER NOTES
Subjective   History of Present Illness  Chief Complaint: Low back pain   History of Present Illness: Patient presents with 2 days of low back pain radiating to his abdomen, says is very consistent with his prior kidney stones, does have some dysuria as well.  Denies any fevers, no other complaints.  Onset: 2 days  Duration: Continuous  Exacerbating / Alleviating factors: None  Associated symptoms: None    Nurses Notes reviewed and agree, including vitals, allergies, social history and prior medical history.    REVIEW OF SYSTEMS: All systems reviewed and not pertinent unless noted.    Positive for: Low back pain, dysuria    Negative for: All other review of systems reviewed and negative unspecified  Review of Systems    Past Medical History:   Diagnosis Date   • Anxiety    • AVM (arteriovenous malformation)     brain.  found after workup for severe headache during hypertension. Advised to control BP   • Back pain    • Constipation    • Depression    • DM2 (diabetes mellitus, type 2) (HCC)     glipizide.  A1C 9 per patient.   • Fatigue    • Fatty liver     transaminitis   • GERD (gastroesophageal reflux disease)    • Headache     REPORTS RELATED TO HTN, constant.  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         Allergies   Allergen Reactions   • Metformin GI Intolerance       Past Surgical History:   Procedure Laterality Date   • COLONOSCOPY N/A 12/4/2018    Procedure: COLONOSCOPY WITH COLD BIOPSY;  Surgeon: Denny Bellamy MD;  Location: [facility] ENDOSCOPY;  Service: Gastroenterology   • EAR TUBES     • EYE MUSCLE SURGERY Bilateral    • TONSILLECTOMY AND  ADENOIDECTOMY         Family History   Problem Relation Age of Onset   • Stroke Father    • Hypertension Father    • Hyperlipidemia Father    • Cancer Father    • Hypertension Mother    • Hyperlipidemia Mother    • Cancer Maternal Uncle    • Heart attack Paternal Uncle    • Hyperlipidemia Paternal Uncle    • Hypertension Paternal Uncle    • Cancer Paternal Uncle        Social History     Socioeconomic History   • Marital status: Single   Tobacco Use   • Smoking status: Never Smoker   • Smokeless tobacco: Never Used   • Tobacco comment: Is not around secondhand smoke in his house, but at family's house   Vaping Use   • Vaping Use: Never used   Substance and Sexual Activity   • Alcohol use: Yes     Comment: OCCASION   • Drug use: No   • Sexual activity: Defer           Objective   Physical Exam  CONSTITUTIONAL: Well developed, well nourished male,  in no acute distress.  VITAL SIGNS: Per nursing, reviewed and noted  SKIN: Exposed skin with no rashes, ulcerations or petechiae.  EYES: PERRLA. EOMI.  ENT: Normal voice.  Patient maintained wearing a mask throughout patient encounter due to coronavirus pandemic  RESPIRATORY:  No increased work of breathing. No retractions.  CARDIOVASCULAR:  Regular rate and rhythm, no murmurs.  Good Peripheral pulses. Good cap refill to extremities.  GI: Abdomen soft, nontender, normal bowel sounds. No hernia. No ascites.  MUSCULOSKELETAL:  No tenderness. Full ROM. Strength and tone grossly normal.  no spasms. no neck or back tenderness or spasm.  NEUROLOGIC: Alert, oriented x 3. No gross deficits. GCS 15.  PSYCH: Appropriate affect.  : No bladder tenderness or distention, no CVA tenderness.    Procedures           ED Course  ED Course as of 09/05/22 2134   Mon Sep 05, 2022   2025 WBC(!): 13.80 [CC]   2025 Hemoglobin: 17.6 [CC]   2025 Hematocrit: 50.2 [CC]   2025 Neutrophil Rel %(!): 83.9 [CC]   2025 Lymphocyte Rel %(!): 7.8 [CC]   2025 BP: 150/96 [CC]   2025 Temp: 99 °F (37.2 °C) [CC]    2025 Heart Rate: 110 [CC]   2025 Resp: 16 [CC]   2025 SpO2: 96 % [CC]      ED Course User Index  [CC] Jaren Mark PA                                           [facility]  Number of Diagnoses or Management Options  Diagnosis management comments: This is a 26-year-old male arrived in stable condition with stable vital signs complaining of flank pain rating to his abdomen consistent with prior kidney stones.  Labs are grossly unremarkable and not mild leukocytosis, CT scan shows no abnormality.  Will discharge with pain medicine, muscle relaxer and antibiotics due to possible pyelonephritis and follow-up with primary care.  Made aware of return precautions.       Amount and/or Complexity of Data Reviewed  Clinical lab tests: reviewed    Risk of Complications, Morbidity, and/or Mortality  Presenting problems: low  Diagnostic procedures: low  Management options: low    Patient Progress  Patient progress: stable      Final diagnoses:   Flank pain   Dysuria       ED Disposition  ED Disposition     ED Disposition   Discharge    Condition   Stable    Comment   --             Ivy Lozano, TRACEE  [address] [place] [address] [phone]    Go to   As needed, If symptoms worsen    [facility] Emergency Department  [address] [address] [phone]  Go to   As needed, If symptoms worsen         Medication List      New Prescriptions    cefdinir 300 MG capsule  Commonly known as: OMNICEF  Take 1 capsule by mouth 2 (Two) Times a Day for 7 days.     cyclobenzaprine 10 MG tablet  Commonly known as: FLEXERIL  Take 1 tablet by mouth 2 (Two) Times a Day As Needed for Muscle Spasms.     meloxicam 7.5 MG tablet  Commonly known as: MOBIC  Take 1 tablet by mouth Daily.        Changed    cloNIDine 0.1 MG tablet  Commonly known as: Catapres  Take 1 tablet by mouth Every 6 (Six) Hours. PRN for SBP>150 mm Hg and/or DBP> 100 mm Hg  Take 2 tablets PO as needed for SBP> 200 mm Hg  What  changed: when to take this           Where to Get Your Medications      These medications were sent to [facility] [place], [place] - [address] [facility] - [phone]  - [phone] [address] [address]    Phone: [phone]   · cefdinir 300 MG capsule  · cyclobenzaprine 10 MG tablet  · meloxicam 7.5 MG tablet          Jaren Mark PA  09/05/22 8000

## 2022-09-22 ENCOUNTER — HOSPITAL ENCOUNTER (OUTPATIENT)
Dept: SLEEP MEDICINE | Facility: HOSPITAL | Age: 27
Discharge: HOME | End: 2022-09-22
Admitting: NURSE PRACTITIONER

## 2022-09-22 DIAGNOSIS — R06.83 SNORING: ICD-10-CM

## 2022-09-22 DIAGNOSIS — I10 ESSENTIAL HYPERTENSION: ICD-10-CM

## 2022-09-22 DIAGNOSIS — G47.19 EXCESSIVE DAYTIME SLEEPINESS: ICD-10-CM

## 2022-09-22 DIAGNOSIS — K21.9 GASTROESOPHAGEAL REFLUX DISEASE, UNSPECIFIED WHETHER ESOPHAGITIS PRESENT: ICD-10-CM

## 2022-09-22 DIAGNOSIS — G47.9 RESTLESS SLEEPER: ICD-10-CM

## 2022-09-22 PROCEDURE — 95810 POLYSOM 6/> YRS 4/> PARAM: CPT

## 2022-09-22 PROCEDURE — 95810 POLYSOM 6/> YRS 4/> PARAM: CPT | Performed by: INTERNAL MEDICINE

## 2022-09-28 ENCOUNTER — TELEPHONE (OUTPATIENT)
Dept: NEUROLOGY | Facility: CLINIC | Age: 27
End: 2022-09-28

## 2022-09-28 DIAGNOSIS — R06.83 SNORING: ICD-10-CM

## 2022-09-28 DIAGNOSIS — K21.9 GASTROESOPHAGEAL REFLUX DISEASE, UNSPECIFIED WHETHER ESOPHAGITIS PRESENT: ICD-10-CM

## 2022-09-28 DIAGNOSIS — I10 ESSENTIAL HYPERTENSION: ICD-10-CM

## 2022-09-28 DIAGNOSIS — G47.9 RESTLESS SLEEPER: Primary | ICD-10-CM

## 2022-09-28 DIAGNOSIS — G47.19 EXCESSIVE DAYTIME SLEEPINESS: ICD-10-CM

## 2023-03-21 ENCOUNTER — TRANSCRIBE ORDERS (OUTPATIENT)
Dept: ADMINISTRATIVE | Facility: HOSPITAL | Age: 28
End: 2023-03-21
Payer: COMMERCIAL

## 2023-03-21 DIAGNOSIS — R10.84 GENERALIZED ABDOMINAL PAIN: Primary | ICD-10-CM

## 2023-04-21 ENCOUNTER — TRANSCRIBE ORDERS (OUTPATIENT)
Dept: GENERAL RADIOLOGY | Facility: HOSPITAL | Age: 28
End: 2023-04-21
Payer: COMMERCIAL

## 2023-04-21 ENCOUNTER — HOSPITAL ENCOUNTER (OUTPATIENT)
Dept: GENERAL RADIOLOGY | Facility: HOSPITAL | Age: 28
Discharge: HOME | End: 2023-04-21
Payer: COMMERCIAL

## 2023-04-21 ENCOUNTER — OFFICE VISIT (OUTPATIENT)
Dept: UROLOGY | Facility: CLINIC | Age: 28
End: 2023-04-21
Payer: COMMERCIAL

## 2023-04-21 VITALS
TEMPERATURE: 98.2 F | HEART RATE: 104 BPM | HEIGHT: 71 IN | WEIGHT: 315 LBS | OXYGEN SATURATION: 96 % | SYSTOLIC BLOOD PRESSURE: 126 MMHG | BODY MASS INDEX: 44.1 KG/M2 | DIASTOLIC BLOOD PRESSURE: 84 MMHG

## 2023-04-21 DIAGNOSIS — E29.1 HYPOGONADISM MALE: Primary | ICD-10-CM

## 2023-04-21 DIAGNOSIS — M25.512 PAIN, JOINT, SHOULDER, LEFT: Primary | ICD-10-CM

## 2023-04-21 DIAGNOSIS — M25.512 PAIN, JOINT, SHOULDER, LEFT: ICD-10-CM

## 2023-04-21 PROCEDURE — 73030 X-RAY EXAM OF SHOULDER: CPT

## 2023-04-21 RX ORDER — TESTOSTERONE CYPIONATE 200 MG/ML
200 INJECTION, SOLUTION INTRAMUSCULAR WEEKLY
Qty: 4 ML | Refills: 0 | Status: SHIPPED | OUTPATIENT
Start: 2023-04-21

## 2023-04-21 NOTE — PROGRESS NOTES
Chief Complaint  Low testosterone    Referring Provider  Sommer Mcclain, APRN    HPI  Mr. Cheung is a 27 y.o. male with history of DM2, HTN, kidney stone who presents with low testosterone.  The serum test was collected due to the following complaints: low energy. Symptoms at least 5 years.     Works 6 days a week, 8-10 hours a week    Low libido   no  Low energy   yes  Poor sleep   no  Mental clarity issues  yes    History of depression? yes  Erectile dysfunction?  no    Any potential interest in conception?  no    Past Medical History  Past Medical History:   Diagnosis Date   • Anxiety    • AVM (arteriovenous malformation)     brain.  found after workup for severe headache during hypertension. Advised to control BP   • Back pain    • Constipation    • Depression    • DM2 (diabetes mellitus, type 2)     glipizide.  A1C 9 per patient.   • Fatigue    • Fatty liver     transaminitis   • GERD (gastroesophageal reflux disease)    • Headache     REPORTS RELATED TO HTN, constant.  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         Past Surgical History  Past Surgical History:   Procedure Laterality Date   • COLONOSCOPY N/A 12/4/2018    Procedure: COLONOSCOPY WITH COLD BIOPSY;  Surgeon: Denny Bellamy MD;  Location: [facility] ENDOSCOPY;  Service: Gastroenterology   • EAR TUBES     • EYE MUSCLE SURGERY Bilateral    • TONSILLECTOMY AND ADENOIDECTOMY         Medications    Current Outpatient Medications:   •  Alogliptin Benzoate 25 MG tablet, Take  by mouth., Disp: , Rfl:   •  amLODIPine (NORVASC) 10 MG tablet, Take 1 tablet by mouth Daily., Disp: , Rfl:   •  Empagliflozin  "(JARDIANCE PO), Take 25 mg by mouth., Disp: , Rfl:   •  FAMOTIDINE PO, Take  by mouth., Disp: , Rfl:   •  FLUoxetine (PROzac) 20 MG capsule, Take 3 capsules by mouth Daily., Disp: , Rfl:   •  glipiZIDE (GLUCOTROL XL) 5 MG ER tablet, Take 1 tablet by mouth Daily., Disp: , Rfl:   •  LOSARTAN POTASSIUM-HCTZ PO, Take  by mouth., Disp: , Rfl:   •  ONETOUCH DELICA LANCETS 33G misc, , Disp: , Rfl:   •  pravastatin (PRAVACHOL) 40 MG tablet, Take 1 tablet by mouth Daily., Disp: , Rfl:   •  CloNIDine (CATAPRES) 0.1 MG tablet, Take 1 tablet by mouth Every 6 (Six) Hours. PRN for SBP>150 mm Hg and/or DBP> 100 mm Hg Take 2 tablets PO as needed for SBP> 200 mm Hg (Patient not taking: Reported on 4/21/2023), Disp: 30 tablet, Rfl: 11  •  cyclobenzaprine (FLEXERIL) 10 MG tablet, Take 1 tablet by mouth 2 (Two) Times a Day As Needed for Muscle Spasms. (Patient not taking: Reported on 4/21/2023), Disp: 20 tablet, Rfl: 0  •  meloxicam (MOBIC) 7.5 MG tablet, Take 1 tablet by mouth Daily. (Patient not taking: Reported on 4/21/2023), Disp: 20 tablet, Rfl: 0    Allergies  Allergies   Allergen Reactions   • Metformin GI Intolerance       Social History  Social History     Socioeconomic History   • Marital status: Single   Tobacco Use   • Smoking status: Never   • Smokeless tobacco: Never   • Tobacco comments:     Is not around secondhand smoke in his house, but at family's house   Vaping Use   • Vaping Use: Never used   Substance and Sexual Activity   • Alcohol use: Yes     Comment: OCCASION   • Drug use: No   • Sexual activity: Defer       Family History  He has no family history of prostate cancer      Physical Exam  Visit Vitals  /84 (BP Location: Left arm, Patient Position: Sitting, Cuff Size: Adult)   Pulse 104   Temp 98.2 °F (36.8 °C) (Temporal)   Ht 180.3 cm (71\")   Wt (!) 158 kg (348 lb)   SpO2 96%   BMI 48.54 kg/m²         Labs    Lab Results   Component Value Date    WBC 13.80 (H) 09/05/2022    HGB 17.6 09/05/2022    HCT 50.2 " 09/05/2022    MCV 88.7 09/05/2022     09/05/2022         Assessment  Mr. Cheung is a 27 y.o. male with low testosterone.      Today we discussed the role testosterone plays for a male patient. I have indicated that low testosterone can be associated with metabolic syndrome, erectile dysfunction, coronary artery disease, diabetes, depression, osteoporosis, fatigue, low sex drive, poor sleep, high cholesterol, increased abdominal fat, muscle loss, irritability, hot flashes, and inability to concentrate.     Treatment options were discussed including SERMs, aromatase inhibitors, topical gel or patch, oral pills, short-acting and long-acting testosterone injections, and testosterone pellets placed in clinic every 4-6 months.    I explained the risks, benefits, and alternatives to testosterone therapies. I informed him of the potential SEs of chest and leg swelling, increased acne, change in mood, polycythemia, and worsening of undiagnosed prostate cancer.       Plan  1.  He wishes to proceed with a trial of TC  2.  Start testosterone cypionate 200 mg weekly  3.  Follow up in 3 months, will obtain labs at follow-up      Call Dr. Mcclain for 2nd testo labs from 2 days ago

## 2023-04-27 ENCOUNTER — DOCUMENTATION (OUTPATIENT)
Dept: UROLOGY | Facility: CLINIC | Age: 28
End: 2023-04-27
Payer: COMMERCIAL

## 2023-04-27 NOTE — PROGRESS NOTES
Second first morning testosterone value is received from the patient's primary care provider.  This was collected on April 18, 2023.  Total testosterone was 150 ng/dL, confirming the diagnosis of hypogonadism.

## 2023-05-18 DIAGNOSIS — E29.1 HYPOGONADISM MALE: Primary | ICD-10-CM

## 2023-05-18 DIAGNOSIS — R79.89 LOW SERUM TESTOSTERONE LEVEL IN MALE: ICD-10-CM

## 2023-05-18 RX ORDER — TESTOSTERONE CYPIONATE 200 MG/ML
200 INJECTION, SOLUTION INTRAMUSCULAR WEEKLY
Qty: 4 ML | Refills: 0 | Status: SHIPPED | OUTPATIENT
Start: 2023-05-18

## 2024-01-03 ENCOUNTER — APPOINTMENT (OUTPATIENT)
Dept: GENERAL RADIOLOGY | Facility: HOSPITAL | Age: 29
End: 2024-01-03
Payer: COMMERCIAL

## 2024-01-03 ENCOUNTER — APPOINTMENT (OUTPATIENT)
Dept: CT IMAGING | Facility: HOSPITAL | Age: 29
End: 2024-01-03
Payer: COMMERCIAL

## 2024-01-03 ENCOUNTER — HOSPITAL ENCOUNTER (EMERGENCY)
Facility: HOSPITAL | Age: 29
Discharge: HOME | End: 2024-01-03
Attending: STUDENT IN AN ORGANIZED HEALTH CARE EDUCATION/TRAINING PROGRAM | Admitting: STUDENT IN AN ORGANIZED HEALTH CARE EDUCATION/TRAINING PROGRAM
Payer: COMMERCIAL

## 2024-01-03 VITALS
RESPIRATION RATE: 20 BRPM | HEIGHT: 71 IN | DIASTOLIC BLOOD PRESSURE: 98 MMHG | SYSTOLIC BLOOD PRESSURE: 162 MMHG | TEMPERATURE: 97.9 F | OXYGEN SATURATION: 96 % | WEIGHT: 315 LBS | HEART RATE: 85 BPM | BODY MASS INDEX: 44.1 KG/M2

## 2024-01-03 DIAGNOSIS — I10 ESSENTIAL HYPERTENSION: Primary | ICD-10-CM

## 2024-01-03 LAB
ALBUMIN SERPL-MCNC: 4.7 G/DL (ref 3.5–5.2)
ALBUMIN/GLOB SERPL: 1.3 G/DL
ALP SERPL-CCNC: 165 U/L (ref 39–117)
ALT SERPL W P-5'-P-CCNC: 92 U/L (ref 1–41)
ANION GAP SERPL CALCULATED.3IONS-SCNC: 11.5 MMOL/L (ref 5–15)
AST SERPL-CCNC: 46 U/L (ref 1–40)
BASOPHILS # BLD AUTO: 0.11 10*3/MM3 (ref 0–0.2)
BASOPHILS NFR BLD AUTO: 1 % (ref 0–1.5)
BILIRUB SERPL-MCNC: 0.5 MG/DL (ref 0–1.2)
BUN SERPL-MCNC: 15 MG/DL (ref 6–20)
BUN/CREAT SERPL: 16.9 (ref 7–25)
CALCIUM SPEC-SCNC: 10 MG/DL (ref 8.6–10.5)
CHLORIDE SERPL-SCNC: 96 MMOL/L (ref 98–107)
CO2 SERPL-SCNC: 27.5 MMOL/L (ref 22–29)
CREAT SERPL-MCNC: 0.89 MG/DL (ref 0.76–1.27)
DEPRECATED RDW RBC AUTO: 38.1 FL (ref 37–54)
EGFRCR SERPLBLD CKD-EPI 2021: 118.2 ML/MIN/1.73
EOSINOPHIL # BLD AUTO: 0.19 10*3/MM3 (ref 0–0.4)
EOSINOPHIL NFR BLD AUTO: 1.7 % (ref 0.3–6.2)
ERYTHROCYTE [DISTWIDTH] IN BLOOD BY AUTOMATED COUNT: 11.9 % (ref 12.3–15.4)
GLOBULIN UR ELPH-MCNC: 3.5 GM/DL
GLUCOSE SERPL-MCNC: 312 MG/DL (ref 65–99)
HCT VFR BLD AUTO: 51.2 % (ref 37.5–51)
HGB BLD-MCNC: 18.2 G/DL (ref 13–17.7)
HOLD SPECIMEN: NORMAL
HOLD SPECIMEN: NORMAL
IMM GRANULOCYTES # BLD AUTO: 0.06 10*3/MM3 (ref 0–0.05)
IMM GRANULOCYTES NFR BLD AUTO: 0.5 % (ref 0–0.5)
LYMPHOCYTES # BLD AUTO: 2.96 10*3/MM3 (ref 0.7–3.1)
LYMPHOCYTES NFR BLD AUTO: 26.9 % (ref 19.6–45.3)
MCH RBC QN AUTO: 31.4 PG (ref 26.6–33)
MCHC RBC AUTO-ENTMCNC: 35.5 G/DL (ref 31.5–35.7)
MCV RBC AUTO: 88.4 FL (ref 79–97)
MONOCYTES # BLD AUTO: 0.73 10*3/MM3 (ref 0.1–0.9)
MONOCYTES NFR BLD AUTO: 6.6 % (ref 5–12)
NEUTROPHILS NFR BLD AUTO: 6.94 10*3/MM3 (ref 1.7–7)
NEUTROPHILS NFR BLD AUTO: 63.3 % (ref 42.7–76)
NRBC BLD AUTO-RTO: 0 /100 WBC (ref 0–0.2)
PLATELET # BLD AUTO: 316 10*3/MM3 (ref 140–450)
PMV BLD AUTO: 9.9 FL (ref 6–12)
POTASSIUM SERPL-SCNC: 4.1 MMOL/L (ref 3.5–5.2)
PROT SERPL-MCNC: 8.2 G/DL (ref 6–8.5)
RBC # BLD AUTO: 5.79 10*6/MM3 (ref 4.14–5.8)
SODIUM SERPL-SCNC: 135 MMOL/L (ref 136–145)
TROPONIN T SERPL HS-MCNC: <6 NG/L
WBC NRBC COR # BLD AUTO: 10.99 10*3/MM3 (ref 3.4–10.8)
WHOLE BLOOD HOLD COAG: NORMAL
WHOLE BLOOD HOLD SPECIMEN: NORMAL

## 2024-01-03 PROCEDURE — 80053 COMPREHEN METABOLIC PANEL: CPT

## 2024-01-03 PROCEDURE — 99284 EMERGENCY DEPT VISIT MOD MDM: CPT

## 2024-01-03 PROCEDURE — 71045 X-RAY EXAM CHEST 1 VIEW: CPT

## 2024-01-03 PROCEDURE — 85025 COMPLETE CBC W/AUTO DIFF WBC: CPT

## 2024-01-03 PROCEDURE — 93005 ELECTROCARDIOGRAM TRACING: CPT

## 2024-01-03 PROCEDURE — 70450 CT HEAD/BRAIN W/O DYE: CPT

## 2024-01-03 PROCEDURE — 84484 ASSAY OF TROPONIN QUANT: CPT

## 2024-01-03 RX ORDER — SODIUM CHLORIDE 0.9 % (FLUSH) 0.9 %
10 SYRINGE (ML) INJECTION AS NEEDED
Status: DISCONTINUED | OUTPATIENT
Start: 2024-01-03 | End: 2024-01-03 | Stop reason: HOSPADM

## 2024-01-03 RX ORDER — HYDROCHLOROTHIAZIDE 25 MG/1
25 TABLET ORAL
Status: DISCONTINUED | OUTPATIENT
Start: 2024-01-03 | End: 2024-01-03 | Stop reason: HOSPADM

## 2024-01-03 RX ORDER — AMLODIPINE BESYLATE 10 MG/1
10 TABLET ORAL DAILY
Qty: 30 TABLET | Refills: 0 | Status: SHIPPED | OUTPATIENT
Start: 2024-01-03

## 2024-01-03 RX ORDER — LOSARTAN POTASSIUM AND HYDROCHLOROTHIAZIDE 25; 100 MG/1; MG/1
1 TABLET ORAL DAILY
Qty: 30 TABLET | Refills: 0 | Status: SHIPPED | OUTPATIENT
Start: 2024-01-03 | End: 2024-02-02

## 2024-01-03 RX ORDER — LOSARTAN POTASSIUM 50 MG/1
100 TABLET ORAL
Status: DISCONTINUED | OUTPATIENT
Start: 2024-01-03 | End: 2024-01-03 | Stop reason: HOSPADM

## 2024-01-03 RX ORDER — AMLODIPINE BESYLATE 5 MG/1
10 TABLET ORAL ONCE
Status: COMPLETED | OUTPATIENT
Start: 2024-01-03 | End: 2024-01-03

## 2024-01-03 RX ADMIN — AMLODIPINE BESYLATE 10 MG: 5 TABLET ORAL at 16:51

## 2024-01-03 RX ADMIN — LOSARTAN POTASSIUM 100 MG: 50 TABLET, FILM COATED ORAL at 16:51

## 2024-01-03 RX ADMIN — HYDROCHLOROTHIAZIDE 25 MG: 25 TABLET ORAL at 16:51

## 2024-01-03 NOTE — Clinical Note
[facility] EMERGENCY DEPARTMENT  [address] [facility] [address]  Phone: [phone]    Ken Cheung was seen and treated in our emergency department on 1/3/2024.  He may return to work on 01/05/2024.         Thank you for choosing [facility].    Gregorio Powell MD

## 2024-01-03 NOTE — ED PROVIDER NOTES
Subjective:  History of Present Illness:    Patient's 28-year-old male history of AVM with brain bleed in the past, hypertension for which she has not been on prescribed medications, hyperlipidemia, type 2 diabetes, obesity presents today with high blood pressure and headache.  Reports that he has been off his blood pressure medications for the last 2 months.  Recently obtained health insurance and is requesting refills on his blood pressure medicine.  Has had headache, states this is fairly typical of high blood pressure in the past.  Denies any chest pain or shortness of breath.      Nurses Notes reviewed and agree, including vitals, allergies, social history and prior medical history.     REVIEW OF SYSTEMS: All systems reviewed and not pertinent unless noted.  Review of Systems   Constitutional:  Positive for activity change. Negative for appetite change, chills, fatigue and fever.   HENT:  Negative for congestion, sinus pressure, sneezing and trouble swallowing.    Eyes:  Negative for discharge and itching.   Respiratory:  Negative for cough and shortness of breath.    Cardiovascular:  Negative for chest pain and palpitations.   Gastrointestinal:  Negative for abdominal distention and abdominal pain.   Endocrine: Negative for cold intolerance and heat intolerance.   Genitourinary:  Negative for decreased urine volume, dysuria and urgency.   Musculoskeletal:  Negative for gait problem, neck pain and neck stiffness.   Skin:  Negative for color change and rash.   Allergic/Immunologic: Negative for immunocompromised state.   Neurological:  Positive for headaches. Negative for facial asymmetry.   Hematological:  Negative for adenopathy.   Psychiatric/Behavioral:  Negative for self-injury and suicidal ideas.        Past Medical History:   Diagnosis Date    Anxiety     AVM (arteriovenous malformation)     brain.  found after workup for severe headache during hypertension. Advised to control BP    Back pain      "Constipation     Depression     DM2 (diabetes mellitus, type 2)     glipizide.  A1C 9 per patient.    Fatigue     Fatty liver     transaminitis    GERD (gastroesophageal reflux disease)     Headache     REPORTS RELATED TO HTN, constant.  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         Allergies:    Metformin      Past Surgical History:   Procedure Laterality Date    COLONOSCOPY N/A 12/4/2018    Procedure: COLONOSCOPY WITH COLD BIOPSY;  Surgeon: Denny Bellamy MD;  Location: [facility] ENDOSCOPY;  Service: Gastroenterology    EAR TUBES      EYE MUSCLE SURGERY Bilateral     TONSILLECTOMY AND ADENOIDECTOMY           Social History     Socioeconomic History    Marital status: Single   Tobacco Use    Smoking status: Never    Smokeless tobacco: Never    Tobacco comments:     Is not around secondhand smoke in his house, but at family's house   Vaping Use    Vaping Use: Never used   Substance and Sexual Activity    Alcohol use: Yes     Comment: OCCASION    Drug use: No    Sexual activity: Defer         Family History   Problem Relation Age of Onset    Stroke Father     Hypertension Father     Hyperlipidemia Father     Cancer Father     Hypertension Mother     Hyperlipidemia Mother     Cancer Maternal Uncle     Heart attack Paternal Uncle     Hyperlipidemia Paternal Uncle     Hypertension Paternal Uncle     Cancer Paternal Uncle        Objective  Physical Exam:  BP (!) 197/118   Pulse 84   Temp 97.9 °F (36.6 °C) (Oral)   Resp 20   Ht 180.3 cm (71\")   Wt (!) 154 kg (340 lb 6.4 oz)   SpO2 96%   BMI 47.48 kg/m²      Physical Exam  Constitutional:       General: He is not in acute " distress.     Appearance: Normal appearance. He is normal weight. He is obese. He is not ill-appearing.   HENT:      Head: Normocephalic and atraumatic.      Nose: Nose normal. No congestion or rhinorrhea.      Mouth/Throat:      Mouth: Mucous membranes are moist.      Pharynx: Oropharynx is clear.   Eyes:      Extraocular Movements: Extraocular movements intact.      Conjunctiva/sclera: Conjunctivae normal.      Pupils: Pupils are equal, round, and reactive to light.   Cardiovascular:      Rate and Rhythm: Normal rate and regular rhythm.      Pulses: Normal pulses.   Pulmonary:      Effort: Pulmonary effort is normal. No respiratory distress.      Breath sounds: Normal breath sounds.   Abdominal:      General: Abdomen is flat. Bowel sounds are normal. There is no distension.      Palpations: Abdomen is soft.      Tenderness: There is no abdominal tenderness.   Musculoskeletal:         General: No swelling or tenderness. Normal range of motion.      Cervical back: Normal range of motion and neck supple. No rigidity or tenderness.   Skin:     General: Skin is warm and dry.      Capillary Refill: Capillary refill takes less than 2 seconds.   Neurological:      General: No focal deficit present.      Mental Status: He is alert and oriented to person, place, and time. Mental status is at baseline.      Cranial Nerves: No cranial nerve deficit.      Sensory: No sensory deficit.      Motor: No weakness.      Coordination: Coordination normal.      Gait: Gait normal.   Psychiatric:         Mood and Affect: Mood normal.         Behavior: Behavior normal.         Thought Content: Thought content normal.         Judgment: Judgment normal.         Procedures    ED Course:    ED Course as of 01/03/24 1849 Wed Jan 03, 2024   1642 EKG interpreted by me, normal sinus rhythm no concerning ST changes noted, rate of 84 [JE]      ED Course User Index  [JE] Gregorio Powell MD       Lab Results (last 24 hours)       Procedure  Component Value Units Date/Time    CBC & Differential [[id number]]  (Abnormal) Collected: 01/03/24 1525    Specimen: Blood Updated: 01/03/24 1532    Narrative:      The following orders were created for panel order CBC & Differential.  Procedure                               Abnormality         Status                     ---------                               -----------         ------                     CBC Auto Differential[[id number]]        Abnormal            Final result                 Please view results for these tests on the individual orders.    Comprehensive Metabolic Panel [[id number]]  (Abnormal) Collected: 01/03/24 1525    Specimen: Blood Updated: 01/03/24 1551     Glucose 312 mg/dL      Comment: Glucose >180, Hemoglobin A1C recommended.        BUN 15 mg/dL      Creatinine 0.89 mg/dL      Sodium 135 mmol/L      Potassium 4.1 mmol/L      Chloride 96 mmol/L      CO2 27.5 mmol/L      Calcium 10.0 mg/dL      Total Protein 8.2 g/dL      Albumin 4.7 g/dL      ALT (SGPT) 92 U/L      AST (SGOT) 46 U/L      Alkaline Phosphatase 165 U/L      Total Bilirubin 0.5 mg/dL      Globulin 3.5 gm/dL      A/G Ratio 1.3 g/dL      BUN/Creatinine Ratio 16.9     Anion Gap 11.5 mmol/L      eGFR 118.2 mL/min/1.73     Narrative:      GFR Normal >60  Chronic Kidney Disease <60  Kidney Failure <15      High Sensitivity Troponin T [[id number]]  (Normal) Collected: 01/03/24 1525    Specimen: Blood Updated: 01/03/24 1555     HS Troponin T <6 ng/L     Narrative:      High Sensitive Troponin T Reference Range:  <14.0 ng/L- Negative Female for AMI  <22.0 ng/L- Negative Male for AMI  >=14 - Abnormal Female indicating possible myocardial injury.  >=22 - Abnormal Male indicating possible myocardial injury.   Clinicians would have to utilize clinical acumen, EKG, Troponin, and serial changes to determine if it is an Acute Myocardial Infarction or myocardial injury due to an underlying chronic condition.         CBC Auto Differential  [[id number]]  (Abnormal) Collected: 01/03/24 1525    Specimen: Blood Updated: 01/03/24 1532     WBC 10.99 10*3/mm3      RBC 5.79 10*6/mm3      Hemoglobin 18.2 g/dL      Hematocrit 51.2 %      MCV 88.4 fL      MCH 31.4 pg      MCHC 35.5 g/dL      RDW 11.9 %      RDW-SD 38.1 fl      MPV 9.9 fL      Platelets 316 10*3/mm3      Neutrophil % 63.3 %      Lymphocyte % 26.9 %      Monocyte % 6.6 %      Eosinophil % 1.7 %      Basophil % 1.0 %      Immature Grans % 0.5 %      Neutrophils, Absolute 6.94 10*3/mm3      Lymphocytes, Absolute 2.96 10*3/mm3      Monocytes, Absolute 0.73 10*3/mm3      Eosinophils, Absolute 0.19 10*3/mm3      Basophils, Absolute 0.11 10*3/mm3      Immature Grans, Absolute 0.06 10*3/mm3      nRBC 0.0 /100 WBC              CT Head Without Contrast    Result Date: 1/3/2024  FINAL REPORT TECHNIQUE: Axial imaging of the head was obtained without contrast. This study was performed with techniques to keep radiation doses as low as reasonably achievable, (ALARA). Individualized dose reduction techniques using automated exposure control or adjustment of mA and/or kV according to the patient's size were employed. CLINICAL HISTORY: Headache, sudden, severe HYPERTENSION. HX OF AVM. FINDINGS: Brain parenchyma is grossly normal. The ventricles are normal in size.  An AVM in the region of the left ambient cistern is noted on series 2 image 15.  There is no gross evidence of hemorrhage, mass lesion or infarct. No extra-axial fluid is seen. The mastoid air cells and paranasal sinuses are normal. There is no acute osseous abnormality.     Impression: No acute intracranial abnormality. Authenticated and Electronically Signed by MD Balderrama Richard on 01/03/2024 06:37:04 PM    XR Chest 1 View    Result Date: 1/3/2024  PROCEDURE: XR CHEST 1 VW-  HISTORY: Chest Pain Triage Protocol, hypertension  COMPARISON:  None  FINDINGS:  Portable view of the chest demonstrates the lungs to be grossly clear. There is no evidence of  effusion, pneumothorax or other significant pleural disease. The mediastinum is unremarkable.  The heart size is normal.      Impression: Unremarkable portable chest.    This report was signed and finalized on 1/3/2024 4:44 PM by Jose Elizalde MD.          MDM     Amount and/or Complexity of Data Reviewed  Decide to obtain previous medical records or to obtain history from someone other than the patient: yes        Initial impression of presenting illness: Headache, hypertension    DDX: includes but is not limited to: Hypertensive emergency, intracranial hemorrhage, ACS    Patient arrives stable with vitals interpreted by myself.     Pertinent features from physical exam: Clear to auscultation, regular rate and rhythm, no murmur, nontender abdominal palpation, normal neuro exam.    Initial diagnostic plan: CBC, CMP, troponin, ECG, chest x-ray, CT head    Results from initial plan were reviewed and interpreted by me revealing negative cardiac workup    Diagnostic information from other sources: Reviewed past medical records    Interventions / Re-evaluation: Given patient's home blood pressure medications    Results/clinical rationale were discussed with patient at bedside    Consultations/Discussion of results with other physicians: Discussed negative workup in our emergency department for hypertensive emergency.  Given prescriptions for patient's antihypertensive and encouraged to follow-up closely with his primary care doctor for refills and for any changes that may be needed to his blood pressure regimen    Disposition plan: Discharge  -----        Final diagnoses:   Essential hypertension          Gregorio Powell MD  01/03/24 3583

## 2024-01-03 NOTE — DISCHARGE INSTRUCTIONS
You were evaluated for high blood pressure.  We got labs and a CT scan of your head that were negative for any acute process.  You are now stable for discharge.  We have refilled prescription for your blood pressure regimen at home and recommend following up with your primary care doctor for further refills of this medication.  You are now stable for discharge.

## 2024-02-07 ENCOUNTER — OFFICE VISIT (OUTPATIENT)
Dept: UROLOGY | Facility: CLINIC | Age: 29
End: 2024-02-07
Payer: COMMERCIAL

## 2024-02-07 ENCOUNTER — TRANSCRIBE ORDERS (OUTPATIENT)
Dept: GENERAL RADIOLOGY | Facility: HOSPITAL | Age: 29
End: 2024-02-07
Payer: COMMERCIAL

## 2024-02-07 ENCOUNTER — HOSPITAL ENCOUNTER (OUTPATIENT)
Dept: GENERAL RADIOLOGY | Facility: HOSPITAL | Age: 29
Discharge: HOME | End: 2024-02-07
Payer: COMMERCIAL

## 2024-02-07 VITALS
SYSTOLIC BLOOD PRESSURE: 122 MMHG | WEIGHT: 315 LBS | DIASTOLIC BLOOD PRESSURE: 86 MMHG | HEIGHT: 71 IN | BODY MASS INDEX: 44.1 KG/M2

## 2024-02-07 DIAGNOSIS — E29.1 HYPOGONADISM MALE: Primary | ICD-10-CM

## 2024-02-07 DIAGNOSIS — M25.512 LEFT SHOULDER PAIN, UNSPECIFIED CHRONICITY: ICD-10-CM

## 2024-02-07 DIAGNOSIS — M25.512 LEFT SHOULDER PAIN, UNSPECIFIED CHRONICITY: Primary | ICD-10-CM

## 2024-02-07 PROCEDURE — 73030 X-RAY EXAM OF SHOULDER: CPT

## 2024-02-07 RX ORDER — SEMAGLUTIDE 1.34 MG/ML
INJECTION, SOLUTION SUBCUTANEOUS
COMMUNITY
Start: 2024-02-05

## 2024-02-07 RX ORDER — TESTOSTERONE CYPIONATE 200 MG/ML
200 INJECTION, SOLUTION INTRAMUSCULAR
Qty: 4 ML | Refills: 0 | Status: SHIPPED | OUTPATIENT
Start: 2024-02-07

## 2024-02-07 NOTE — PROGRESS NOTES
Office Visit Low Testosterone      Patient Name: Ken Cheung  : 1995   MRN: [id number]     Chief Complaint:   Chief Complaint   Patient presents with    Hypogonadism in male     Follow up       Referring Provider: Sommer Mcclain APRN    History of Present Illness: Ken Cheung is a 28 y.o. male who presents today with low testosterone. Had started TRT last year lost his insurance and could not afford his medication has insurance now and wants to resume TRT  The serum test was collected due to the following complaints: fatigue.  Patient has been tested for JONO and was negative    Low libido   yes  Low energy   yes  Poor sleep   yes  Mental clarity issues  yes    History of depression? yes  Erectile dysfunction?  no  History of Blood clots?            No  History of Stroke?                   No    Any potential interest in conception?  no    Objective      Review of System:   As noted in HPI    Past Medical History:   Past Medical History:   Diagnosis Date    Anxiety     AVM (arteriovenous malformation)     brain.  found after workup for severe headache during hypertension. Advised to control BP    Back pain     Constipation     Depression     DM2 (diabetes mellitus, type 2)     glipizide.  A1C 9 per patient.    Fatigue     Fatty liver     transaminitis    GERD (gastroesophageal reflux disease)     Headache     REPORTS RELATED TO HTN, constant.  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         Past Surgical History:   Past Surgical History:   Procedure Laterality Date    COLONOSCOPY N/A 2018    Procedure: COLONOSCOPY WITH COLD  "BIOPSY;  Surgeon: Denny Bellamy MD;  Location: [facility] ENDOSCOPY;  Service: Gastroenterology    EAR TUBES      EYE MUSCLE SURGERY Bilateral     TONSILLECTOMY AND ADENOIDECTOMY         Family History:   Family History   Problem Relation Age of Onset    Stroke Father     Hypertension Father     Hyperlipidemia Father     Cancer Father     Hypertension Mother     Hyperlipidemia Mother     Cancer Maternal Uncle     Heart attack Paternal Uncle     Hyperlipidemia Paternal Uncle     Hypertension Paternal Uncle     Cancer Paternal Uncle        Social History:   Social History     Socioeconomic History    Marital status: Single   Tobacco Use    Smoking status: Never    Smokeless tobacco: Never    Tobacco comments:     Is not around secondhand smoke in his house, but at family's house   Vaping Use    Vaping Use: Never used   Substance and Sexual Activity    Alcohol use: Yes     Comment: OCCASION    Drug use: No    Sexual activity: Defer       Medications:     Current Outpatient Medications:     Alogliptin Benzoate 25 MG tablet, Take  by mouth., Disp: , Rfl:     amLODIPine (NORVASC) 10 MG tablet, Take 1 tablet by mouth Daily., Disp: , Rfl:     amLODIPine (NORVASC) 10 MG tablet, Take 1 tablet by mouth Daily., Disp: 30 tablet, Rfl: 0    Empagliflozin (JARDIANCE PO), Take 25 mg by mouth., Disp: , Rfl:     FAMOTIDINE PO, Take  by mouth., Disp: , Rfl:     FLUoxetine (PROzac) 20 MG capsule, Take 3 capsules by mouth Daily., Disp: , Rfl:     glipiZIDE (GLUCOTROL XL) 5 MG ER tablet, Take 1 tablet by mouth Daily., Disp: , Rfl:     LOSARTAN POTASSIUM-HCTZ PO, Take  by mouth., Disp: , Rfl:     Needle, Disp, 18G X 1-1/2\" misc, Use for drawing up the medication, Disp: 50 each, Rfl: 3    Needle, Disp, 23G X 1-1/2\" misc, 1 Device 1 (One) Time Per Week., Disp: 30 each, Rfl: 11    ONETOUCH DELICA LANCETS 33G misc, , Disp: , Rfl:     Ozempic, 1 MG/DOSE, 4 MG/3ML solution pen-injector, , Disp: , Rfl:     pravastatin (PRAVACHOL) 40 MG tablet, " "Take 1 tablet by mouth Daily., Disp: , Rfl:     Syringe, Disposable, 3 ML misc, 1 syringe 1 (One) Time Per Week., Disp: 100 each, Rfl: 11    CloNIDine (CATAPRES) 0.1 MG tablet, Take 1 tablet by mouth Every 6 (Six) Hours. PRN for SBP>150 mm Hg and/or DBP> 100 mm Hg Take 2 tablets PO as needed for SBP> 200 mm Hg (Patient not taking: Reported on 4/21/2023), Disp: 30 tablet, Rfl: 11    cyclobenzaprine (FLEXERIL) 10 MG tablet, Take 1 tablet by mouth 2 (Two) Times a Day As Needed for Muscle Spasms. (Patient not taking: Reported on 4/21/2023), Disp: 20 tablet, Rfl: 0    losartan-hydrochlorothiazide (Hyzaar) 100-25 MG per tablet, Take 1 tablet by mouth Daily for 30 days., Disp: 30 tablet, Rfl: 0    meloxicam (MOBIC) 7.5 MG tablet, Take 1 tablet by mouth Daily. (Patient not taking: Reported on 4/21/2023), Disp: 20 tablet, Rfl: 0    Needle, Disp, 18G X 1-1/2\" misc, Use for drawing up the medication, Disp: 50 each, Rfl: 3    Needle, Disp, 23G X 1-1/2\" misc, Use 1 Device 1 (One) Time Per Week., Disp: 30 each, Rfl: 11    Syringe, Disposable, 3 ML misc, Use 1 syringe 1 (One) Time Per Week., Disp: 100 each, Rfl: 11    Testosterone Cypionate (Depo-Testosterone) 200 MG/ML injection, Inject 1 mL into the appropriate muscle as directed by prescriber Every 7 (Seven) Days. Call for refills with last injection, Disp: 4 mL, Rfl: 0    Allergies:   Allergies   Allergen Reactions    Metformin GI Intolerance       Objective     Physical Exam:   Vital Signs:   Vitals:    02/07/24 1033   BP: 122/86   BP Location: Left arm   Patient Position: Sitting   Cuff Size: Adult   Weight: (!) 154 kg (340 lb 6.4 oz)   Height: 180.3 cm (71\")     Body mass index is 47.48 kg/m².     Constitutional: NAD, WDWN.   Neurological: A + O x 3    Psych: normal mood and affect      Labs  No results found for: \"TESTOSTERONE\"    No results found for: \"PSA\"    Lab Results   Component Value Date    WBC 10.99 (H) 01/03/2024    HGB 18.2 (H) 01/03/2024    HCT 51.2 (H) " 01/03/2024    MCV 88.4 01/03/2024     01/03/2024       Assessment / Plan    Assessment  Mr. Cheung is a 28 y.o. male with low testosterone.  Today we discussed the role testosterone plays for a male patient. I have indicated that low testosterone can be associated with metabolic syndrome, erectile dysfunction, coronary artery disease, diabetes, depression, osteoporosis, fatigue, low sex drive, poor sleep, high cholesterol, increased abdominal fat, muscle loss, irritability, hot flashes, and inability to concentrate.     Treatment options were discussed including SERMs, aromatase inhibitors, topical gel or patch, oral troches, nasal spray, testosterone injections every 2-3 weeks, long-acting injections every 10 weeks, and testosterone pellets placed in clinic every 4-6 months.    We discussed testosterone therapy is a life long therapy, we discussed the decreased fertility risk with proceeding with testosterone    I explained the risks, benefits, and alternatives to testosterone therapies. I informed him of the potential SEs of chest and leg swelling, increased acne, change in mood, polycythemia, and worsening of undiagnosed prostate cancer.     Plan  1.  He wishes to proceed with a trial of testosterone cypionate 200 mg weekly  2.  Obtain TT, hematocrit and hemoglobin 3 months      Follow Up:   Return in about 3 months (around 5/7/2024) for Follow up Cristi.    TRACEE Orellana, NP-C  [facility]

## 2024-03-01 DIAGNOSIS — E29.1 HYPOGONADISM MALE: ICD-10-CM

## 2024-03-01 RX ORDER — TESTOSTERONE CYPIONATE 200 MG/ML
INJECTION, SOLUTION INTRAMUSCULAR
Qty: 4 ML | Refills: 0 | Status: SHIPPED | OUTPATIENT
Start: 2024-03-01

## 2024-03-29 DIAGNOSIS — E29.1 HYPOGONADISM MALE: ICD-10-CM

## 2024-04-01 RX ORDER — TESTOSTERONE CYPIONATE 200 MG/ML
INJECTION, SOLUTION INTRAMUSCULAR
Qty: 4 ML | Refills: 0 | Status: SHIPPED | OUTPATIENT
Start: 2024-04-01

## 2024-05-01 ENCOUNTER — TELEPHONE (OUTPATIENT)
Dept: UROLOGY | Facility: CLINIC | Age: 29
End: 2024-05-01
Payer: COMMERCIAL

## 2024-09-27 ENCOUNTER — HOSPITAL ENCOUNTER (EMERGENCY)
Facility: HOSPITAL | Age: 29
Discharge: HOME | End: 2024-09-27
Attending: STUDENT IN AN ORGANIZED HEALTH CARE EDUCATION/TRAINING PROGRAM
Payer: COMMERCIAL

## 2024-09-27 ENCOUNTER — APPOINTMENT (OUTPATIENT)
Dept: CT IMAGING | Facility: HOSPITAL | Age: 29
End: 2024-09-27
Payer: COMMERCIAL

## 2024-09-27 VITALS
OXYGEN SATURATION: 96 % | RESPIRATION RATE: 16 BRPM | HEART RATE: 90 BPM | TEMPERATURE: 97.9 F | HEIGHT: 70 IN | SYSTOLIC BLOOD PRESSURE: 153 MMHG | WEIGHT: 300 LBS | BODY MASS INDEX: 42.95 KG/M2 | DIASTOLIC BLOOD PRESSURE: 89 MMHG

## 2024-09-27 DIAGNOSIS — Q78.8 OSTEOPOIKILOSIS: ICD-10-CM
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
## 2024-09-27 DIAGNOSIS — S22.080S T12 COMPRESSION FRACTURE, SEQUELA: Primary | ICD-10-CM

## 2024-09-27 DIAGNOSIS — R03.0 ELEVATED BLOOD PRESSURE READING: ICD-10-CM

## 2024-09-27 PROCEDURE — 99284 EMERGENCY DEPT VISIT MOD MDM: CPT

## 2024-09-27 PROCEDURE — 72131 CT LUMBAR SPINE W/O DYE: CPT

## 2024-09-27 PROCEDURE — 72128 CT CHEST SPINE W/O DYE: CPT

## 2024-09-27 PROCEDURE — 72192 CT PELVIS W/O DYE: CPT

## 2024-09-27 RX ORDER — METHOCARBAMOL 750 MG/1
750 TABLET, FILM COATED ORAL ONCE
Status: COMPLETED | OUTPATIENT
Start: 2024-09-27 | End: 2024-09-27

## 2024-09-27 RX ORDER — LIDOCAINE 4 G/G
1 PATCH TOPICAL ONCE
Status: DISCONTINUED | OUTPATIENT
Start: 2024-09-27 | End: 2024-09-27 | Stop reason: HOSPADM

## 2024-09-27 RX ORDER — ACETAMINOPHEN 325 MG/1
975 TABLET ORAL ONCE
Status: COMPLETED | OUTPATIENT
Start: 2024-09-27 | End: 2024-09-27

## 2024-09-27 RX ORDER — METHOCARBAMOL 500 MG/1
500 TABLET, FILM COATED ORAL 3 TIMES DAILY PRN
Qty: 30 TABLET | Refills: 0 | Status: SHIPPED | OUTPATIENT
Start: 2024-09-27

## 2024-09-27 RX ORDER — LIDOCAINE 50 MG/G
1 PATCH TOPICAL DAILY PRN
Qty: 30 PATCH | Refills: 0 | Status: SHIPPED | OUTPATIENT
Start: 2024-09-27

## 2024-09-27 RX ORDER — ACETAMINOPHEN 500 MG
1000 TABLET ORAL EVERY 8 HOURS PRN
Qty: 30 TABLET | Refills: 0 | Status: SHIPPED | OUTPATIENT
Start: 2024-09-27

## 2024-09-27 RX ADMIN — LIDOCAINE 1 PATCH: 4 PATCH TOPICAL at 08:35

## 2024-09-27 RX ADMIN — ACETAMINOPHEN 975 MG: 325 TABLET, FILM COATED ORAL at 08:36

## 2024-09-27 RX ADMIN — METHOCARBAMOL 750 MG: 750 TABLET ORAL at 08:37

## 2024-09-27 NOTE — Clinical Note
[facility] EMERGENCY DEPARTMENT  [address] [address]  Phone: [phone]    Ken Cheung was seen and treated in our emergency department on 9/27/2024.  He may return to work on 09/30/2024.         Thank you for choosing [facility].    Bernardo Sam PA-C

## 2024-09-27 NOTE — ED PROVIDER NOTES
Subjective  History of Present Illness:    This is a 28-year-old male, presenting today for evaluation of back pain.  Patient reports that he was walking downstairs this morning when he slipped falling down at least 3-4 stairs, he reports pain in the middle of his back to his buttocks.  He has been ambulatory and ambulated without difficulty into the emergency room.  He drove himself here and did not come via ambulance.  He denies any saddle anesthesias urinary or fecal incontinence or urinary retention.  He did not hit his head, no headaches, no anticoagulation.  No neck pain.  He denies any preceding chest pain or shortness of breath, no preceding dizziness or lightheadedness.  Mechanical fall in nature.      Nurses Notes reviewed and agree, including vitals, allergies, social history and prior medical history.     REVIEW OF SYSTEMS: All systems reviewed and not pertinent unless noted.  Review of Systems   Respiratory:  Negative for shortness of breath.    Cardiovascular:  Negative for chest pain.   Musculoskeletal:  Positive for back pain. Negative for neck pain.   Neurological:  Negative for dizziness and headaches.   All other systems reviewed and are negative.      Past Medical History:   Diagnosis Date    Anxiety     AVM (arteriovenous malformation)     brain.  found after workup for severe headache during hypertension. Advised to control BP    Back pain     Constipation     Depression     DM2 (diabetes mellitus, type 2)     glipizide.  A1C 9 per patient.    Fatigue     Fatty liver     transaminitis    GERD (gastroesophageal reflux disease)     Headache     REPORTS RELATED TO HTN, constant.  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         Allergies:    Metformin      Past Surgical History:   Procedure Laterality Date    COLONOSCOPY N/A 12/4/2018    Procedure: COLONOSCOPY WITH COLD BIOPSY;  Surgeon: Denny Bellamy MD;  Location: [facility] ENDOSCOPY;  Service: Gastroenterology    EAR TUBES      EYE MUSCLE SURGERY Bilateral     TONSILLECTOMY AND ADENOIDECTOMY           Social History     Socioeconomic History    Marital status: Single   Tobacco Use    Smoking status: Never    Smokeless tobacco: Never    Tobacco comments:     Is not around secondhand smoke in his house, but at family's house   Vaping Use    Vaping status: Never Used   Substance and Sexual Activity    Alcohol use: Yes     Comment: OCCASION    Drug use: No    Sexual activity: Defer         Family History   Problem Relation Age of Onset    Stroke Father     Hypertension Father     Hyperlipidemia Father     Cancer Father     Hypertension Mother     Hyperlipidemia Mother     Cancer Maternal Uncle     Heart attack Paternal Uncle     Hyperlipidemia Paternal Uncle     Hypertension Paternal Uncle     Cancer Paternal Uncle        Objective  Physical Exam:  BP (!) 173/108   Pulse 90   Temp 97.9 °F (36.6 °C) (Oral)   Resp 18   Ht 177.8 cm (70\")   Wt 136 kg (300 lb)   SpO2 96%   BMI 43.05 kg/m²      Physical Exam  Vitals and nursing note reviewed. Exam conducted with a chaperone present.   Constitutional:       General: He is not in acute distress.     Appearance: Normal appearance. He is obese. He is not ill-appearing, toxic-appearing or diaphoretic.   HENT:      Head: Normocephalic and atraumatic.      Nose: Nose normal.      Mouth/Throat:      Mouth: Mucous membranes are moist.      Pharynx: Oropharynx is clear.   Eyes:      Extraocular Movements: Extraocular movements intact.      Pupils: Pupils are equal, round, and reactive to light.   Cardiovascular:      Rate and Rhythm: Normal rate and regular rhythm.      " Pulses: Normal pulses.   Pulmonary:      Effort: Pulmonary effort is normal. No respiratory distress.      Breath sounds: Normal breath sounds. No stridor. No wheezing, rhonchi or rales.   Chest:      Chest wall: No tenderness.   Abdominal:      General: There is no distension.      Palpations: Abdomen is soft.      Tenderness: There is no abdominal tenderness. There is no guarding.   Musculoskeletal:         General: Tenderness present. No swelling or deformity. Normal range of motion.      Cervical back: Normal range of motion and neck supple. No tenderness.   Skin:     General: Skin is warm and dry.      Capillary Refill: Capillary refill takes less than 2 seconds.      Comments: No bruising noted to the buttocks, or back   Neurological:      General: No focal deficit present.      Mental Status: He is alert and oriented to person, place, and time.   Psychiatric:         Mood and Affect: Mood normal.         Behavior: Behavior normal.         Thought Content: Thought content normal.         Judgment: Judgment normal.             Procedures    ED Course:    ED Course as of 09/27/24 0855   Fri Sep 27, 2024   0805 I have reviewed the mid-level provider(s) note and verbally discussed the case/plan of care.  I was available for consultation as needed at all times during the patient's visit in the Emergency Department.  I agree with the clinical impression, plan, and disposition unless otherwise stated in the MDM below.    ATTENDING ATTESTATION  HPI: 28 year old male presents with low back pain status post fall down several stairs. No head injury. No other reported traumatic injuries. No anticoagulation use.    MDM: ED workup reviewed.    Radiology reports reviewed.     CT Thoracic Spine Without Contrast    Result Date: 9/27/2024  No acute bony process.  Minimal remote T12 compression fracture.    CTDI: 29 mGy DLP:3338.98 mGy.cm  This report was signed and finalized on 9/27/2024 8:50 AM by Amy López MD.      CT  Pelvis Without Contrast    Result Date: 9/27/2024  No acute bony abnormality.  Degenerative change bilateral SI joints.    CTDI: 29 mGy DLP:3338.98 mGy.cm  This report was signed and finalized on 9/27/2024 8:47 AM by Amy López MD.      CT Lumbar Spine Without Contrast    Result Date: 9/27/2024  No acute bony abnormality.  Chronic findings similar to prior.    CTDI: 29 mGy DLP:3338.98 mGy.cm  This report was signed and finalized on 9/27/2024 8:44 AM by Amy López MD.       [JS]   0807 Repeat blood pressure 165/99, patient denies any symptoms of hypertension such as chest pain shortness of air or headaches. [JR]   0807 Patient reports he is on losartan and amlodipine at home, has been taking as directed [JR]   0853 CT Lumbar Spine Without Contrast [JR]   0853 CT Pelvis Without Contrast [JR]   0853 CT Thoracic Spine Without Contrast [JR]   0855 BP(!): 173/108 [JR]      ED Course User Index  [JR] Bernardo Sam PA-C  [JS] Sean Waldrop DO       Lab Results (last 24 hours)       ** No results found for the last 24 hours. **             CT Thoracic Spine Without Contrast    Result Date: 9/27/2024  PROCEDURE: CT THORACIC SPINE WO CONTRAST-  HISTORY: fall, mid back pain rule out fracture  PROCEDURE: Axial images were obtained through the thoracic spine by computed tomography. Reconstruction images were also performed.  FINDINGS: Sagittal images demonstrate normal disc heights. There is no subluxation. There is minimal anterior wedging of the T12 vertebral body with no acute fracture line seen.      Impression: No acute bony process.  Minimal remote T12 compression fracture.    CTDI: 29 mGy DLP:3338.98 mGy.cm  This report was signed and finalized on 9/27/2024 8:50 AM by Amy López MD.      CT Pelvis Without Contrast    Result Date: 9/27/2024  PROCEDURE: CT PELVIS WO CONTRAST-  HISTORY: fall, sacral pain rule out fracture  COMPARISON: CT abdomen pelvis September 5, 2022..  PROCEDURE: Axial images were obtained  from the iliac crest to the pubic symphysis by computed tomography. This study was performed with techniques to keep radiation doses as low as reasonably achievable, (ALARA). Individualized dose reduction techniques using automated exposure control or adjustment of mA and/or kV according to the patient size were employed.  FINDINGS:  PELVIS: The appendix is is identified and appears normal. Visualized bowel appears normal. Prostate is normal in size. The urinary bladder is unremarkable. There is no significant fluid or adenopathy. The visualized portions of the GI tract is nonobstructed. Again noted are the innumerable, subcentimeter sclerotic lesions scattered throughout the pelvis and visualized proximal femurs as well as the lumbar spine. Appearance is most consistent with osteopoikilosis and is similar to the prior study. There is narrowing and vacuum phenomenon of the SI joints bilaterally consistent with degenerative change.    Impression: No acute bony abnormality.  Degenerative change bilateral SI joints.    CTDI: 29 mGy DLP:3338.98 mGy.cm  This report was signed and finalized on 9/27/2024 8:47 AM by Amy López MD.      CT Lumbar Spine Without Contrast    Result Date: 9/27/2024  PROCEDURE: CT LUMBAR SPINE WO CONTRAST-  HISTORY: fall, low back pain rule out fracture  PROCEDURE: Axial images were obtained through the lumbar spine by computed tomography. Reconstruction images were also performed. This study was performed with techniques to keep radiation doses as low as reasonably achievable, (ALARA). Individualized dose reduction techniques using automated exposure control or adjustment of mA and/or kV according to the patient size were employed.  FINDINGS: There is proper alignment with no subluxation. The vertebral bodies are of proper height. The facets overlap at all levels. Compare CT abdomen pelvis 9/5/2022. Again noted are innumerable, subcentimeter sclerotic lesions in the sacrum and visualized iliac  wings. A few are identified in the lumbar vertebral bodies. Appearance is consistent with osteopoikilosis and is similar to prior..      Impression: No acute bony abnormality.  Chronic findings similar to prior.    CTDI: 29 mGy DLP:3338.98 mGy.cm  This report was signed and finalized on 9/27/2024 8:44 AM by Amy López MD.          [facility]      Initial impression of presenting illness: This is a 28 male presenting for evaluation of fall, back pain.  Patient fell down 3-4 stairs, did not hit his head.  No anticoagulation    DDX: includes but is not limited to: Fracture strain sprain contusion subluxation, hematoma, others    Patient arrives hemodynamically stable, hypertensive, afebrile nontachycardic nontachypneic nonhypoxic with vitals interpreted by myself.     Pertinent features from physical exam: Tenderness to palpation midline of thoracic lumbar spine and gluteal region.  There is no obvious bruising, no step-off or deformities palpated.  2+ pedal pulses, no hip or pelvis instability or tenderness palpated, no further long bone tenderness palpated, head is atraumatic, lungs clear, cardiac auscultation regular and rhythm, abdomen soft nontender, no bruising to the abdominal wall, no bruising to the buttocks or back.  Chaperone present at bedside for examination.    Initial diagnostic plan: CT lumbar spine thoracic and CT pelvis without contrast    Results from initial plan were reviewed and interpreted by me revealing CT lumbar spine per radiology no acute bony abnormality chronic findings similar to prior.  Findings consistent of osteopoikilosis, CT pelvis per radiology no acute bony abnormality degenerative changes SI joints bilaterally.  CT thoracic spine per radiology minimal remote T12 compression fracture.    Diagnostic information from other sources: Record reviewed    Interventions / Re-evaluation: Tylenol, Lidoderm patch, Robaxin.  Stable for discharge    Results/clinical rationale were discussed with  patient at bedside    Consultations/Discussion of results with other physicians: N/A    Disposition plan: Discharge, follow-up with orthopedics.  Will send muscle relaxers, Tylenol, Lidoderm patches.  Return precautions given.  Supportive care discussed.  Discussed lifestyle modifications for elevated blood pressure reading given that he is on multiple therapies recommend close follow-up with his primary care physician for further medication management and gave return precautions for symptomatic hypertension.  -----    Final diagnoses:   Fall, initial encounter   Elevated blood pressure reading   Osteopoikilosis   T12 compression fracture, sequela          Bernardo Sam PA-C  09/27/24 0856

## 2024-09-27 NOTE — DISCHARGE INSTRUCTIONS
Follow-up with orthopedics.  Their numbers been attached, identified on your scans was findings consistent with osteopoikilosis which is lesions that formed on and near bones, follow-up with Ortho for this as well as a old compression fracture that was identified at T12.  Take medications as directed as needed only.

## (undated) DEVICE — PAD GRND REM POLYHESIVE A/ DISP

## (undated) DEVICE — KT ORCA VLV SXN AIR/H2O W/SEAL 1P/U STRL

## (undated) DEVICE — Device

## (undated) DEVICE — JELLY,LUBE,STERILE,FLIP TOP,TUBE,2-OZ: Brand: MEDLINE

## (undated) DEVICE — GLV SURG SENSICARE W/ALOE PF LF 8.5 STRL

## (undated) DEVICE — ENDOGATOR AUXILIARY WATER JET CONNECTOR: Brand: ENDOGATOR

## (undated) DEVICE — FRCP BIOP RADLJAW4 HOT 2.2X240 BX40